# Patient Record
Sex: FEMALE | Race: WHITE | Employment: FULL TIME | URBAN - METROPOLITAN AREA
[De-identification: names, ages, dates, MRNs, and addresses within clinical notes are randomized per-mention and may not be internally consistent; named-entity substitution may affect disease eponyms.]

---

## 2017-01-05 PROBLEM — R50.9 FEVER OF UNKNOWN ORIGIN: Status: ACTIVE | Noted: 2017-01-05

## 2023-01-14 ENCOUNTER — APPOINTMENT (OUTPATIENT)
Dept: ULTRASOUND IMAGING | Age: 63
DRG: 446 | End: 2023-01-14
Payer: COMMERCIAL

## 2023-01-14 ENCOUNTER — HOSPITAL ENCOUNTER (INPATIENT)
Age: 63
LOS: 3 days | Discharge: HOME | DRG: 446 | End: 2023-01-17
Attending: EMERGENCY MEDICINE | Admitting: SURGERY
Payer: COMMERCIAL

## 2023-01-14 ENCOUNTER — APPOINTMENT (OUTPATIENT)
Dept: CT IMAGING | Age: 63
DRG: 446 | End: 2023-01-14
Payer: COMMERCIAL

## 2023-01-14 DIAGNOSIS — R74.01 TRANSAMINITIS: ICD-10-CM

## 2023-01-14 DIAGNOSIS — K81.0 ACUTE CHOLECYSTITIS: Primary | ICD-10-CM

## 2023-01-14 LAB
ALBUMIN SERPL-MCNC: 4.1 G/DL (ref 3.5–5.2)
ALP BLD-CCNC: 116 U/L (ref 35–104)
ALT SERPL-CCNC: 46 U/L (ref 0–32)
ANION GAP SERPL CALCULATED.3IONS-SCNC: 11 MMOL/L (ref 7–16)
AST SERPL-CCNC: 37 U/L (ref 0–31)
BACTERIA: ABNORMAL /HPF
BASOPHILS ABSOLUTE: 0.02 E9/L (ref 0–0.2)
BASOPHILS RELATIVE PERCENT: 0.4 % (ref 0–2)
BILIRUB SERPL-MCNC: 0.4 MG/DL (ref 0–1.2)
BILIRUBIN URINE: NEGATIVE
BLOOD, URINE: ABNORMAL
BUN BLDV-MCNC: 13 MG/DL (ref 6–23)
CALCIUM SERPL-MCNC: 9.6 MG/DL (ref 8.6–10.2)
CHLORIDE BLD-SCNC: 101 MMOL/L (ref 98–107)
CLARITY: CLEAR
CO2: 26 MMOL/L (ref 22–29)
COLOR: YELLOW
CREAT SERPL-MCNC: 0.8 MG/DL (ref 0.5–1)
EOSINOPHILS ABSOLUTE: 0.1 E9/L (ref 0.05–0.5)
EOSINOPHILS RELATIVE PERCENT: 1.8 % (ref 0–6)
EPITHELIAL CELLS, UA: ABNORMAL /HPF
GFR SERPL CREATININE-BSD FRML MDRD: >60 ML/MIN/1.73
GLUCOSE BLD-MCNC: 99 MG/DL (ref 74–99)
GLUCOSE URINE: NEGATIVE MG/DL
HCT VFR BLD CALC: 42.9 % (ref 34–48)
HEMOGLOBIN: 14.2 G/DL (ref 11.5–15.5)
IMMATURE GRANULOCYTES #: 0.02 E9/L
IMMATURE GRANULOCYTES %: 0.4 % (ref 0–5)
KETONES, URINE: NEGATIVE MG/DL
LACTIC ACID: 0.9 MMOL/L (ref 0.5–2.2)
LEUKOCYTE ESTERASE, URINE: NEGATIVE
LIPASE: 24 U/L (ref 13–60)
LYMPHOCYTES ABSOLUTE: 0.8 E9/L (ref 1.5–4)
LYMPHOCYTES RELATIVE PERCENT: 14.8 % (ref 20–42)
MCH RBC QN AUTO: 30.2 PG (ref 26–35)
MCHC RBC AUTO-ENTMCNC: 33.1 % (ref 32–34.5)
MCV RBC AUTO: 91.3 FL (ref 80–99.9)
MONOCYTES ABSOLUTE: 0.44 E9/L (ref 0.1–0.95)
MONOCYTES RELATIVE PERCENT: 8.1 % (ref 2–12)
NEUTROPHILS ABSOLUTE: 4.03 E9/L (ref 1.8–7.3)
NEUTROPHILS RELATIVE PERCENT: 74.5 % (ref 43–80)
NITRITE, URINE: NEGATIVE
PDW BLD-RTO: 13 FL (ref 11.5–15)
PH UA: 5.5 (ref 5–9)
PLATELET # BLD: 272 E9/L (ref 130–450)
PMV BLD AUTO: 9.7 FL (ref 7–12)
POTASSIUM SERPL-SCNC: 3.6 MMOL/L (ref 3.5–5)
PROTEIN UA: NEGATIVE MG/DL
RBC # BLD: 4.7 E12/L (ref 3.5–5.5)
RBC UA: ABNORMAL /HPF (ref 0–2)
SODIUM BLD-SCNC: 138 MMOL/L (ref 132–146)
SPECIFIC GRAVITY UA: 1.02 (ref 1–1.03)
TOTAL PROTEIN: 7.1 G/DL (ref 6.4–8.3)
UROBILINOGEN, URINE: 0.2 E.U./DL
WBC # BLD: 5.4 E9/L (ref 4.5–11.5)
WBC UA: ABNORMAL /HPF (ref 0–5)

## 2023-01-14 PROCEDURE — 76705 ECHO EXAM OF ABDOMEN: CPT

## 2023-01-14 PROCEDURE — 99285 EMERGENCY DEPT VISIT HI MDM: CPT

## 2023-01-14 PROCEDURE — 81001 URINALYSIS AUTO W/SCOPE: CPT

## 2023-01-14 PROCEDURE — 85025 COMPLETE CBC W/AUTO DIFF WBC: CPT

## 2023-01-14 PROCEDURE — 2580000003 HC RX 258: Performed by: EMERGENCY MEDICINE

## 2023-01-14 PROCEDURE — 6360000002 HC RX W HCPCS: Performed by: SURGERY

## 2023-01-14 PROCEDURE — C9113 INJ PANTOPRAZOLE SODIUM, VIA: HCPCS | Performed by: SURGERY

## 2023-01-14 PROCEDURE — 6360000004 HC RX CONTRAST MEDICATION: Performed by: RADIOLOGY

## 2023-01-14 PROCEDURE — A4216 STERILE WATER/SALINE, 10 ML: HCPCS | Performed by: SURGERY

## 2023-01-14 PROCEDURE — 87045 FECES CULTURE AEROBIC BACT: CPT

## 2023-01-14 PROCEDURE — 87449 NOS EACH ORGANISM AG IA: CPT

## 2023-01-14 PROCEDURE — 96361 HYDRATE IV INFUSION ADD-ON: CPT

## 2023-01-14 PROCEDURE — 74177 CT ABD & PELVIS W/CONTRAST: CPT

## 2023-01-14 PROCEDURE — 83605 ASSAY OF LACTIC ACID: CPT

## 2023-01-14 PROCEDURE — 96360 HYDRATION IV INFUSION INIT: CPT

## 2023-01-14 PROCEDURE — 83690 ASSAY OF LIPASE: CPT

## 2023-01-14 PROCEDURE — 87324 CLOSTRIDIUM AG IA: CPT

## 2023-01-14 PROCEDURE — 99222 1ST HOSP IP/OBS MODERATE 55: CPT | Performed by: SURGERY

## 2023-01-14 PROCEDURE — 2580000003 HC RX 258: Performed by: SURGERY

## 2023-01-14 PROCEDURE — 80053 COMPREHEN METABOLIC PANEL: CPT

## 2023-01-14 PROCEDURE — 1200000000 HC SEMI PRIVATE

## 2023-01-14 PROCEDURE — 2500000003 HC RX 250 WO HCPCS: Performed by: SURGERY

## 2023-01-14 PROCEDURE — 87077 CULTURE AEROBIC IDENTIFY: CPT

## 2023-01-14 RX ORDER — MORPHINE SULFATE 2 MG/ML
2 INJECTION, SOLUTION INTRAMUSCULAR; INTRAVENOUS EVERY 4 HOURS PRN
Status: DISCONTINUED | OUTPATIENT
Start: 2023-01-14 | End: 2023-01-17 | Stop reason: HOSPADM

## 2023-01-14 RX ORDER — 0.9 % SODIUM CHLORIDE 0.9 %
1000 INTRAVENOUS SOLUTION INTRAVENOUS ONCE
Status: COMPLETED | OUTPATIENT
Start: 2023-01-14 | End: 2023-01-14

## 2023-01-14 RX ORDER — SODIUM CHLORIDE 9 MG/ML
INJECTION, SOLUTION INTRAVENOUS PRN
Status: DISCONTINUED | OUTPATIENT
Start: 2023-01-14 | End: 2023-01-17 | Stop reason: HOSPADM

## 2023-01-14 RX ORDER — METRONIDAZOLE 500 MG/100ML
500 INJECTION, SOLUTION INTRAVENOUS EVERY 8 HOURS
Status: DISCONTINUED | OUTPATIENT
Start: 2023-01-14 | End: 2023-01-17

## 2023-01-14 RX ORDER — ONDANSETRON 4 MG/1
4 TABLET, ORALLY DISINTEGRATING ORAL EVERY 8 HOURS PRN
Status: DISCONTINUED | OUTPATIENT
Start: 2023-01-14 | End: 2023-01-17 | Stop reason: HOSPADM

## 2023-01-14 RX ORDER — ONDANSETRON 2 MG/ML
4 INJECTION INTRAMUSCULAR; INTRAVENOUS EVERY 6 HOURS PRN
Status: DISCONTINUED | OUTPATIENT
Start: 2023-01-14 | End: 2023-01-17 | Stop reason: HOSPADM

## 2023-01-14 RX ORDER — SODIUM CHLORIDE 0.9 % (FLUSH) 0.9 %
10 SYRINGE (ML) INJECTION PRN
Status: DISCONTINUED | OUTPATIENT
Start: 2023-01-14 | End: 2023-01-17 | Stop reason: HOSPADM

## 2023-01-14 RX ORDER — ENOXAPARIN SODIUM 100 MG/ML
40 INJECTION SUBCUTANEOUS DAILY
Status: DISCONTINUED | OUTPATIENT
Start: 2023-01-14 | End: 2023-01-17 | Stop reason: HOSPADM

## 2023-01-14 RX ORDER — SODIUM CHLORIDE 0.9 % (FLUSH) 0.9 %
10 SYRINGE (ML) INJECTION EVERY 12 HOURS SCHEDULED
Status: DISCONTINUED | OUTPATIENT
Start: 2023-01-14 | End: 2023-01-17 | Stop reason: HOSPADM

## 2023-01-14 RX ORDER — PANTOPRAZOLE SODIUM 20 MG/1
40 TABLET, DELAYED RELEASE ORAL DAILY
COMMUNITY

## 2023-01-14 RX ORDER — SODIUM CHLORIDE, SODIUM LACTATE, POTASSIUM CHLORIDE, CALCIUM CHLORIDE 600; 310; 30; 20 MG/100ML; MG/100ML; MG/100ML; MG/100ML
INJECTION, SOLUTION INTRAVENOUS CONTINUOUS
Status: DISCONTINUED | OUTPATIENT
Start: 2023-01-14 | End: 2023-01-15

## 2023-01-14 RX ORDER — MORPHINE SULFATE 4 MG/ML
4 INJECTION, SOLUTION INTRAMUSCULAR; INTRAVENOUS EVERY 4 HOURS PRN
Status: DISCONTINUED | OUTPATIENT
Start: 2023-01-14 | End: 2023-01-17 | Stop reason: HOSPADM

## 2023-01-14 RX ADMIN — METRONIDAZOLE 500 MG: 500 INJECTION, SOLUTION INTRAVENOUS at 18:56

## 2023-01-14 RX ADMIN — IOPAMIDOL 75 ML: 755 INJECTION, SOLUTION INTRAVENOUS at 15:07

## 2023-01-14 RX ADMIN — CEFTRIAXONE 1000 MG: 1 INJECTION, POWDER, FOR SOLUTION INTRAMUSCULAR; INTRAVENOUS at 18:47

## 2023-01-14 RX ADMIN — SODIUM CHLORIDE 1000 ML: 9 INJECTION, SOLUTION INTRAVENOUS at 12:22

## 2023-01-14 RX ADMIN — SODIUM CHLORIDE, POTASSIUM CHLORIDE, SODIUM LACTATE AND CALCIUM CHLORIDE: 600; 310; 30; 20 INJECTION, SOLUTION INTRAVENOUS at 20:21

## 2023-01-14 RX ADMIN — SODIUM CHLORIDE 40 MG: 9 INJECTION, SOLUTION INTRAMUSCULAR; INTRAVENOUS; SUBCUTANEOUS at 18:49

## 2023-01-14 RX ADMIN — ENOXAPARIN SODIUM 40 MG: 100 INJECTION SUBCUTANEOUS at 18:52

## 2023-01-14 ASSESSMENT — PAIN SCALES - GENERAL: PAINLEVEL_OUTOF10: 8

## 2023-01-14 ASSESSMENT — ENCOUNTER SYMPTOMS
SINUS PRESSURE: 0
VOMITING: 0
EYE PAIN: 0
SORE THROAT: 0
NAUSEA: 1
BACK PAIN: 0
DIARRHEA: 1
EYE REDNESS: 0
ABDOMINAL DISTENTION: 0
WHEEZING: 0
EYE DISCHARGE: 0
COUGH: 0
ABDOMINAL PAIN: 1
SHORTNESS OF BREATH: 0

## 2023-01-14 ASSESSMENT — PAIN - FUNCTIONAL ASSESSMENT: PAIN_FUNCTIONAL_ASSESSMENT: 0-10

## 2023-01-14 NOTE — H&P
General Surgery History and Physical  [facility]    Patient's Name/Date of Birth: Shobha Freitas / 1960    Date: January 14, 2023     Surgeon: Amara Avila M.D.    PCP: No primary care provider on file. Chief Complaint: Abdominal pain diarrhea    HPI:   Shobha Freitas is a 65 y.o. female who presents for evaluation of abdominal pain diarrhea. Timing is constant, radiation to upper abdomen, alleviated by n.p.o. and started several weeks ago and severity is 7/10. Patient admits ongoing issues with abdominal pain and diarrhea. States that anything she eats or drinks causes diarrhea and horrible crampy abdominal pain. Most of her pain radiates to the right upper quadrant. Denies similar in the past.  No shortness of breath or chest pain. Does have a history of breast cancer and had a mastectomy on the left side previously in 2016. Denies prior knowledge of gallbladder stones. Denies previous hepatic insufficiency. No alcohol use    Patient Active Problem List   Diagnosis    Fever of unknown origin    Acute cholecystitis       Past Medical History:   Diagnosis Date    Cancer ([address]     breast cancer, with left masectomy in 11/2016       Past Surgical History:   Procedure Laterality Date    BREAST SURGERY         No Known Allergies    The patient has a family history that is negative for severe cardiovascular or respiratory issues, negative for reaction to anesthesia. Specifically the patient reported no history of gastrointestinal cancer in his mother or his father to their knowledge. Time spent reviewing past medical, surgical, social and family history, vitals, nursing assessment and images. No changes from above documented history.     Social History     Socioeconomic History    Marital status:      Spouse name: Not on file    Number of children: Not on file    Years of education: Not on file    Highest education level: Not on file   Occupational History    Not on file Tobacco Use    Smoking status: Never    Smokeless tobacco: Not on file   Vaping Use    Vaping Use: Never used   Substance and Sexual Activity    Alcohol use: No    Drug use: Not on file    Sexual activity: Not on file   Other Topics Concern    Not on file   Social History Narrative    Not on file     Social Determinants of Health     Financial Resource Strain: Not on file   Food Insecurity: Not on file   Transportation Needs: Not on file   Physical Activity: Not on file   Stress: Not on file   Social Connections: Not on file   Intimate Partner Violence: Not on file   Housing Stability: Not on file       I have reviewed relevant labs from this admission and interpretation is included in my assessment and plan    Review of Systems    A complete 10 system review was performed and are otherwise negative unless mentioned in the above HPI. Specific negatives are listed below but may not include all those reviewed.     General ROS: negative obtundation, AMS  ENT ROS: negative rhinorrhea, epistaxis  Allergy and Immunology ROS: negative itchy/watery eyes or nasal congestion  Hematological and Lymphatic ROS: negative spontaneous bleeding or bruising  Endocrine ROS: negative  lethargy, mood swings, palpitations or polydipsia/polyuria  Respiratory ROS: negative sputum changes, stridor, tachypnea or wheezing  Cardiovascular ROS: negative for - loss of consciousness, murmur or orthopnea  Gastrointestinal ROS: negative for - hematochezia or hematemesis  Genito-Urinary ROS: negative for -  genital discharge or hematuria  Musculoskeletal ROS: negative for - focal weakness, gangrene  Psych/Neuro ROS: negative for - visual or auditory hallucinations, suicidal ideation    Physical exam:   /89   Pulse (!) 105   Temp 97.8 °F (36.6 °C)   Resp 16   Ht 5' 6\" (1.676 m)   Wt 190 lb (86.2 kg)   SpO2 99%   BMI 30.67 kg/m²   General appearance:  NAD, appears stated age  Head: NCAT, PERRLA, EOMI, red conjunctiva  Neck: supple, no masses, trachea midline  Lungs: Equal chest rise bilateral, no retractions, no wheezing  Heart: Reg rate  Abdomen: soft, mildly tender upper abdomen, mildly distended  Skin; warm and dry, no cyanosis  Gu: no cva tenderness  Extremities: atraumatic, no focal motor deficits, no open wounds  Psych: No tremor, visual hallucinations      Radiology: I reviewed relevant abdominal imaging from this admission and that available in the EMR including CT abd/pel from admission. My assessment is gallstones mild gallbladder wall thickening with dilation of the common    Assessment:  Hugo Cabezas is a 65 y.o. female with acute cholecystitis, transaminitis  Patient Active Problem List   Diagnosis    Fever of unknown origin    Acute cholecystitis         Plan:  Admission  IV fluid hydration  IV antibiotic  Stool studies  Full liquid diet n.p.o. past midnight  Pending her improvement overnight likely cholecystectomy in the next 24 to 48 hours  Discussed risks of injury to liver, common bile duct, hepatic duct, surrounding vascular structures, small bowel, stomach. Risk for further surgery to correct complications. Plan for laparoscopic, possible open cholecystectomy with possible intraoperative cholangiogram. Patient agrees and all questions answered to their and family's satisfaction        NOTE: This report, in part or full, may have been transcribed using voice recognition software. Every effort was made to ensure accuracy; however, inadvertent computerized transcription errors may be present. Please excuse any transcriptional grammatical or spelling errors that may have escaped my editorial review.     Seamus Shrestha MD  4:34 PM  1/14/2023

## 2023-01-14 NOTE — ED PROVIDER NOTES
Patient with right upper quadrant abdominal pain associated with nausea and diarrhea that began several days ago. No fever or chills. Abdominal Pain  Pain location:  RUQ  Pain quality: aching    Pain radiates to:  Does not radiate  Pain severity:  Moderate  Onset quality:  Sudden  Duration:  1 week  Timing:  Intermittent  Progression:  Waxing and waning  Chronicity:  New  Relieved by:  None tried  Worsened by:  Eating  Ineffective treatments:  None tried  Associated symptoms: anorexia, diarrhea and nausea    Associated symptoms: no chest pain, no chills, no cough, no dysuria, no fever, no shortness of breath, no sore throat and no vomiting    Risk factors: obesity       Review of Systems   Constitutional:  Negative for chills and fever. HENT:  Negative for sinus pressure and sore throat. Eyes:  Negative for pain, discharge and redness. Respiratory:  Negative for cough, shortness of breath and wheezing. Cardiovascular:  Negative for chest pain. Gastrointestinal:  Positive for abdominal pain, anorexia, diarrhea and nausea. Negative for abdominal distention and vomiting. Genitourinary:  Negative for dysuria and frequency. Musculoskeletal:  Negative for arthralgias and back pain. Skin:  Negative for rash and wound. Neurological:  Negative for weakness and headaches. Hematological:  Negative for adenopathy. All other systems reviewed and are negative. Physical Exam  Vitals and nursing note reviewed. Constitutional:       Appearance: She is well-developed. HENT:      Head: Normocephalic and atraumatic. Eyes:      Pupils: Pupils are equal, round, and reactive to light. Cardiovascular:      Rate and Rhythm: Regular rhythm. Tachycardia present. Heart sounds: Normal heart sounds. No murmur heard. Pulmonary:      Effort: Pulmonary effort is normal. No respiratory distress. Breath sounds: Normal breath sounds. No wheezing or rales.    Abdominal:      General: Bowel sounds are normal.      Palpations: Abdomen is soft. Tenderness: There is abdominal tenderness in the right upper quadrant. There is no guarding or rebound. Musculoskeletal:      Cervical back: Normal range of motion and neck supple. Skin:     General: Skin is warm and dry. Neurological:      Mental Status: She is alert and oriented to person, place, and time. Cranial Nerves: No cranial nerve deficit. Coordination: Coordination normal.        Procedures     MDM     Today's evaluation was designed to evaluate the patient's chief complaint of upper abdominal pain based on multiple differential diagnosis. The goal being to differentiate between emergent, urgent, and non urgent etiologies. The patient understands that not all possibilities can be completely evaluated or ruled out during this visit and require further evaluation by a primary care physician or specialist who can coordinate and evaluate further testing and treatments. Some of the differentials considered today include (but are not limited to):     Upper abdominal pain syndromes:    Right upper quadrant:  Biliary etiology-gallstones, cholecystitis, cholangitis, sphincter of Oddi dysfunction  Hepatic etiologies-hepatitis, perihepatitis, liver abscess, Budd-Chiari syndrome, portal vein thrombosis    Epigastric pain:  Acute myocardial infarction, pancreatitis, peptic ulcer disease, GERD, gastritis, dyspepsia, gastroparesis    Left upper quadrant pain:  Splenomegaly, splenic infarction, splenic abscess, splenic rupture      Lower abdominal pain syndromes:    Acute appendicitis, diverticulitis, kidney stones, pyelonephritis, cystitis, acute urinary retention, infectious colitis      Diffuse abdominal pain syndromes:    Obstruction, perforation, mesenteric ischemia, inflammatory bowel disease, viral gastroenteritis, spontaneous bacterial peritonitis, malignancy, celiac disease, ketoacidosis, adrenal insufficiency, foodborne disease, irritable bowel, constipation, cannabinoid hyperemesis syndrome. Less common causes:  abdominal aortic aneurysm, abdominal compartment syndrome, abdominal migraine, hepatic porphyrias, angioedema      Many as above ruled out by CAT scan and lab work. Ultrasound does confirm acute cholecystitis. Discussed with Dr. Ilana Vazquez, general surgeon, bedside. He has evaluated the patient and agrees to admit.         --------------------------------------------- PAST HISTORY ---------------------------------------------  Past Medical History:  has a past medical history of [facility] Past Surgical History:  has a past surgical history that includes Breast surgery. Social History:  reports that she has never smoked. She does not have any smokeless tobacco history on file. She reports that she does not drink alcohol. Family History: family history is not on file. The patients home medications have been reviewed. Allergies: Patient has no known allergies.     -------------------------------------------------- RESULTS -------------------------------------------------    LABS:  Results for orders placed or performed during the hospital encounter of 01/14/23   CBC with Auto Differential   Result Value Ref Range    WBC 5.4 4.5 - 11.5 E9/L    RBC 4.70 3.50 - 5.50 E12/L    Hemoglobin 14.2 11.5 - 15.5 g/dL    Hematocrit 42.9 34.0 - 48.0 %    MCV 91.3 80.0 - 99.9 fL    MCH 30.2 26.0 - 35.0 pg    MCHC 33.1 32.0 - 34.5 %    RDW 13.0 11.5 - 15.0 fL    Platelets 925 069 - 620 E9/L    MPV 9.7 7.0 - 12.0 fL    Neutrophils % 74.5 43.0 - 80.0 %    Immature Granulocytes % 0.4 0.0 - 5.0 %    Lymphocytes % 14.8 (L) 20.0 - 42.0 %    Monocytes % 8.1 2.0 - 12.0 %    Eosinophils % 1.8 0.0 - 6.0 %    Basophils % 0.4 0.0 - 2.0 %    Neutrophils Absolute 4.03 1.80 - 7.30 E9/L    Immature Granulocytes # 0.02 E9/L    Lymphocytes Absolute 0.80 (L) 1.50 - 4.00 E9/L    Monocytes Absolute 0.44 0.10 - 0.95 E9/L    Eosinophils Absolute 0.10 0.05 - 0.50 E9/L    Basophils Absolute 0.02 0.00 - 0.20 E9/L   Comprehensive Metabolic Panel   Result Value Ref Range    Sodium 138 132 - 146 mmol/L    Potassium 3.6 3.5 - 5.0 mmol/L    Chloride 101 98 - 107 mmol/L    CO2 26 22 - 29 mmol/L    Anion Gap 11 7 - 16 mmol/L    Glucose 99 74 - 99 mg/dL    BUN 13 6 - 23 mg/dL    Creatinine 0.8 0.5 - 1.0 mg/dL    Est, Glom Filt Rate >60 >=60 mL/min/1.73    Calcium 9.6 8.6 - 10.2 mg/dL    Total Protein 7.1 6.4 - 8.3 g/dL    Albumin 4.1 3.5 - 5.2 g/dL    Total Bilirubin 0.4 0.0 - 1.2 mg/dL    Alkaline Phosphatase 116 (H) 35 - 104 U/L    ALT 46 (H) 0 - 32 U/L    AST 37 (H) 0 - 31 U/L   Lipase   Result Value Ref Range    Lipase 24 13 - 60 U/L   Lactic Acid   Result Value Ref Range    Lactic Acid 0.9 0.5 - 2.2 mmol/L   Urinalysis   Result Value Ref Range    Color, UA Yellow Straw/Yellow    Clarity, UA Clear Clear    Glucose, Ur Negative Negative mg/dL    Bilirubin Urine Negative Negative    Ketones, Urine Negative Negative mg/dL    Specific Gravity, UA 1.025 1.005 - 1.030    Blood, Urine TRACE (A) Negative    pH, UA 5.5 5.0 - 9.0    Protein, UA Negative Negative mg/dL    Urobilinogen, Urine 0.2 <2.0 E.U./dL    Nitrite, Urine Negative Negative    Leukocyte Esterase, Urine Negative Negative   Microscopic Urinalysis   Result Value Ref Range    WBC, UA 0-1 0 - 5 /HPF    RBC, UA 1-3 0 - 2 /HPF    Epithelial Cells, UA FEW /HPF    Bacteria, UA FEW (A) None Seen /HPF       RADIOLOGY:  CT ABDOMEN PELVIS W IV CONTRAST Additional Contrast? None   Final Result   1. No indication for acute intraperitoneal retroperitoneal process in the   abdomen pelvis. 2.  No indication for metastatic disease in the abdomen or in the pelvis.          US GALLBLADDER RUQ    (Results Pending)           ------------------------- NURSING NOTES AND VITALS REVIEWED ---------------------------  Date / Time Roomed:  1/14/2023 11:13 AM  ED Bed Assignment:  13/13    The nursing notes within the ED encounter and vital signs as below have been reviewed. Patient Vitals for the past 24 hrs:   BP Temp Pulse Resp SpO2 Height Weight   01/14/23 1115 -- -- -- -- -- 5' 6\" (1.676 m) 190 lb (86.2 kg)   01/14/23 1112 133/89 -- -- -- -- -- --   01/14/23 1051 -- 97.8 °F (36.6 °C) (!) 105 16 99 % -- --       Oxygen Saturation Interpretation: Normal    ------------------------------------------ PROGRESS NOTES ------------------------------------------  Re-evaluation(s):  Time: 1600  Patients symptoms show no change  Repeat physical examination is not changed    Counseling:  I have spoken with the patient and discussed todays results, in addition to providing specific details for the plan of care and counseling regarding the diagnosis and prognosis. Their questions are answered at this time and they are agreeable with the plan of admission.    --------------------------------- ADDITIONAL PROVIDER NOTES ---------------------------------  Consultations:  See above  This patient's ED course included: a personal history and physicial examination and multiple bedside re-evaluations    This patient has remained hemodynamically stable during their ED course. Diagnosis:  1. Acute cholecystitis    2. Transaminitis        Disposition:  Patient's disposition: Admit to med/surg floor  Patient's condition is stable.            Racheal Gallagher DO  01/14/23 [id number]

## 2023-01-15 LAB
ADENOVIRUS BY PCR: NOT DETECTED
ALBUMIN SERPL-MCNC: 3.5 G/DL (ref 3.5–5.2)
ALP BLD-CCNC: 93 U/L (ref 35–104)
ALT SERPL-CCNC: 33 U/L (ref 0–32)
ANION GAP SERPL CALCULATED.3IONS-SCNC: 12 MMOL/L (ref 7–16)
AST SERPL-CCNC: 24 U/L (ref 0–31)
BASOPHILS ABSOLUTE: 0.01 E9/L (ref 0–0.2)
BASOPHILS RELATIVE PERCENT: 0.2 % (ref 0–2)
BILIRUB SERPL-MCNC: 0.3 MG/DL (ref 0–1.2)
BILIRUBIN DIRECT: <0.2 MG/DL (ref 0–0.3)
BILIRUBIN, INDIRECT: ABNORMAL MG/DL (ref 0–1)
BORDETELLA PARAPERTUSSIS BY PCR: NOT DETECTED
BORDETELLA PERTUSSIS BY PCR: NOT DETECTED
BUN BLDV-MCNC: 7 MG/DL (ref 6–23)
C DIFF TOXIN/ANTIGEN: NORMAL
CALCIUM SERPL-MCNC: 8.3 MG/DL (ref 8.6–10.2)
CHLAMYDOPHILIA PNEUMONIAE BY PCR: NOT DETECTED
CHLORIDE BLD-SCNC: 102 MMOL/L (ref 98–107)
CO2: 24 MMOL/L (ref 22–29)
CORONAVIRUS 229E BY PCR: NOT DETECTED
CORONAVIRUS HKU1 BY PCR: NOT DETECTED
CORONAVIRUS NL63 BY PCR: NOT DETECTED
CORONAVIRUS OC43 BY PCR: NOT DETECTED
CREAT SERPL-MCNC: 0.8 MG/DL (ref 0.5–1)
EOSINOPHILS ABSOLUTE: 0.08 E9/L (ref 0.05–0.5)
EOSINOPHILS RELATIVE PERCENT: 1.8 % (ref 0–6)
GFR SERPL CREATININE-BSD FRML MDRD: >60 ML/MIN/1.73
GLUCOSE BLD-MCNC: 98 MG/DL (ref 74–99)
HCT VFR BLD CALC: 34.1 % (ref 34–48)
HEMOGLOBIN: 11.9 G/DL (ref 11.5–15.5)
HUMAN METAPNEUMOVIRUS BY PCR: NOT DETECTED
HUMAN RHINOVIRUS/ENTEROVIRUS BY PCR: NOT DETECTED
IMMATURE GRANULOCYTES #: 0.01 E9/L
IMMATURE GRANULOCYTES %: 0.2 % (ref 0–5)
INFLUENZA A BY PCR: NOT DETECTED
INFLUENZA B BY PCR: NOT DETECTED
LYMPHOCYTES ABSOLUTE: 0.76 E9/L (ref 1.5–4)
LYMPHOCYTES RELATIVE PERCENT: 16.9 % (ref 20–42)
MAGNESIUM: 1.5 MG/DL (ref 1.6–2.6)
MCH RBC QN AUTO: 30.9 PG (ref 26–35)
MCHC RBC AUTO-ENTMCNC: 34.9 % (ref 32–34.5)
MCV RBC AUTO: 88.6 FL (ref 80–99.9)
MONOCYTES ABSOLUTE: 0.52 E9/L (ref 0.1–0.95)
MONOCYTES RELATIVE PERCENT: 11.6 % (ref 2–12)
MYCOPLASMA PNEUMONIAE BY PCR: NOT DETECTED
NEUTROPHILS ABSOLUTE: 3.11 E9/L (ref 1.8–7.3)
NEUTROPHILS RELATIVE PERCENT: 69.3 % (ref 43–80)
PARAINFLUENZA VIRUS 1 BY PCR: NOT DETECTED
PARAINFLUENZA VIRUS 2 BY PCR: NOT DETECTED
PARAINFLUENZA VIRUS 3 BY PCR: NOT DETECTED
PARAINFLUENZA VIRUS 4 BY PCR: NOT DETECTED
PDW BLD-RTO: 13 FL (ref 11.5–15)
PLATELET # BLD: 217 E9/L (ref 130–450)
PMV BLD AUTO: 9.7 FL (ref 7–12)
POTASSIUM REFLEX MAGNESIUM: 2.8 MMOL/L (ref 3.5–5)
RBC # BLD: 3.85 E12/L (ref 3.5–5.5)
RESPIRATORY SYNCYTIAL VIRUS BY PCR: NOT DETECTED
SARS-COV-2, PCR: NOT DETECTED
SODIUM BLD-SCNC: 138 MMOL/L (ref 132–146)
TOTAL PROTEIN: 5.8 G/DL (ref 6.4–8.3)
WBC # BLD: 4.5 E9/L (ref 4.5–11.5)

## 2023-01-15 PROCEDURE — 6370000000 HC RX 637 (ALT 250 FOR IP): Performed by: INTERNAL MEDICINE

## 2023-01-15 PROCEDURE — 0202U NFCT DS 22 TRGT SARS-COV-2: CPT

## 2023-01-15 PROCEDURE — 2580000003 HC RX 258: Performed by: SURGERY

## 2023-01-15 PROCEDURE — 99221 1ST HOSP IP/OBS SF/LOW 40: CPT | Performed by: SURGERY

## 2023-01-15 PROCEDURE — 80048 BASIC METABOLIC PNL TOTAL CA: CPT

## 2023-01-15 PROCEDURE — 6360000002 HC RX W HCPCS: Performed by: SURGERY

## 2023-01-15 PROCEDURE — 85025 COMPLETE CBC W/AUTO DIFF WBC: CPT

## 2023-01-15 PROCEDURE — 83735 ASSAY OF MAGNESIUM: CPT

## 2023-01-15 PROCEDURE — 87040 BLOOD CULTURE FOR BACTERIA: CPT

## 2023-01-15 PROCEDURE — 80076 HEPATIC FUNCTION PANEL: CPT

## 2023-01-15 PROCEDURE — 6370000000 HC RX 637 (ALT 250 FOR IP): Performed by: SURGERY

## 2023-01-15 PROCEDURE — 1200000000 HC SEMI PRIVATE

## 2023-01-15 PROCEDURE — 2500000003 HC RX 250 WO HCPCS: Performed by: SURGERY

## 2023-01-15 PROCEDURE — C9113 INJ PANTOPRAZOLE SODIUM, VIA: HCPCS | Performed by: SURGERY

## 2023-01-15 PROCEDURE — 87088 URINE BACTERIA CULTURE: CPT

## 2023-01-15 PROCEDURE — 6360000002 HC RX W HCPCS: Performed by: NURSE PRACTITIONER

## 2023-01-15 PROCEDURE — 36415 COLL VENOUS BLD VENIPUNCTURE: CPT

## 2023-01-15 PROCEDURE — A4216 STERILE WATER/SALINE, 10 ML: HCPCS | Performed by: SURGERY

## 2023-01-15 RX ORDER — ACETAMINOPHEN 325 MG/1
650 TABLET ORAL EVERY 4 HOURS PRN
Status: DISCONTINUED | OUTPATIENT
Start: 2023-01-15 | End: 2023-01-17 | Stop reason: HOSPADM

## 2023-01-15 RX ORDER — LOPERAMIDE HYDROCHLORIDE 2 MG/1
2 CAPSULE ORAL 4 TIMES DAILY PRN
Status: DISCONTINUED | OUTPATIENT
Start: 2023-01-15 | End: 2023-01-17 | Stop reason: HOSPADM

## 2023-01-15 RX ORDER — POTASSIUM CHLORIDE 7.45 MG/ML
10 INJECTION INTRAVENOUS PRN
Status: DISCONTINUED | OUTPATIENT
Start: 2023-01-15 | End: 2023-01-17 | Stop reason: HOSPADM

## 2023-01-15 RX ORDER — MAGNESIUM SULFATE 1 G/100ML
1000 INJECTION INTRAVENOUS PRN
Status: DISCONTINUED | OUTPATIENT
Start: 2023-01-15 | End: 2023-01-17 | Stop reason: HOSPADM

## 2023-01-15 RX ORDER — POTASSIUM CHLORIDE 20 MEQ/1
40 TABLET, EXTENDED RELEASE ORAL PRN
Status: DISCONTINUED | OUTPATIENT
Start: 2023-01-15 | End: 2023-01-17 | Stop reason: HOSPADM

## 2023-01-15 RX ORDER — SODIUM CHLORIDE AND POTASSIUM CHLORIDE 300; 900 MG/100ML; MG/100ML
INJECTION, SOLUTION INTRAVENOUS CONTINUOUS
Status: DISCONTINUED | OUTPATIENT
Start: 2023-01-15 | End: 2023-01-17 | Stop reason: HOSPADM

## 2023-01-15 RX ADMIN — ACETAMINOPHEN 650 MG: 325 TABLET ORAL at 04:30

## 2023-01-15 RX ADMIN — METRONIDAZOLE 500 MG: 500 INJECTION, SOLUTION INTRAVENOUS at 03:54

## 2023-01-15 RX ADMIN — POTASSIUM CHLORIDE 10 MEQ: 10 INJECTION, SOLUTION INTRAVENOUS at 17:32

## 2023-01-15 RX ADMIN — POTASSIUM CHLORIDE 10 MEQ: 10 INJECTION, SOLUTION INTRAVENOUS at 10:34

## 2023-01-15 RX ADMIN — ACETAMINOPHEN 650 MG: 325 TABLET ORAL at 10:44

## 2023-01-15 RX ADMIN — POTASSIUM CHLORIDE 10 MEQ: 10 INJECTION, SOLUTION INTRAVENOUS at 14:41

## 2023-01-15 RX ADMIN — SERTRALINE 50 MG: 50 TABLET, FILM COATED ORAL at 19:22

## 2023-01-15 RX ADMIN — POTASSIUM CHLORIDE 10 MEQ: 10 INJECTION, SOLUTION INTRAVENOUS at 12:57

## 2023-01-15 RX ADMIN — CEFTRIAXONE 1000 MG: 1 INJECTION, POWDER, FOR SOLUTION INTRAMUSCULAR; INTRAVENOUS at 08:51

## 2023-01-15 RX ADMIN — POTASSIUM CHLORIDE 10 MEQ: 10 INJECTION, SOLUTION INTRAVENOUS at 22:20

## 2023-01-15 RX ADMIN — POTASSIUM CHLORIDE 10 MEQ: 10 INJECTION, SOLUTION INTRAVENOUS at 19:26

## 2023-01-15 RX ADMIN — ENOXAPARIN SODIUM 40 MG: 100 INJECTION SUBCUTANEOUS at 08:52

## 2023-01-15 RX ADMIN — MAGNESIUM SULFATE HEPTAHYDRATE 1000 MG: 1 INJECTION, SOLUTION INTRAVENOUS at 07:37

## 2023-01-15 RX ADMIN — SODIUM CHLORIDE 40 MG: 9 INJECTION, SOLUTION INTRAMUSCULAR; INTRAVENOUS; SUBCUTANEOUS at 08:51

## 2023-01-15 RX ADMIN — MAGNESIUM SULFATE HEPTAHYDRATE 1000 MG: 1 INJECTION, SOLUTION INTRAVENOUS at 07:33

## 2023-01-15 ASSESSMENT — PAIN SCALES - GENERAL
PAINLEVEL_OUTOF10: 7
PAINLEVEL_OUTOF10: 10

## 2023-01-15 ASSESSMENT — PAIN DESCRIPTION - ORIENTATION: ORIENTATION: ANTERIOR;POSTERIOR

## 2023-01-15 ASSESSMENT — PAIN DESCRIPTION - DESCRIPTORS: DESCRIPTORS: ACHING;DULL;DISCOMFORT

## 2023-01-15 ASSESSMENT — PAIN DESCRIPTION - LOCATION: LOCATION: HEAD

## 2023-01-15 ASSESSMENT — PAIN - FUNCTIONAL ASSESSMENT: PAIN_FUNCTIONAL_ASSESSMENT: PREVENTS OR INTERFERES SOME ACTIVE ACTIVITIES AND ADLS

## 2023-01-15 NOTE — CONSULTS
Department of Internal Medicine  Internal Medicine Consultation Note    Primary Care Physician: No primary care provider on file. Admitting Physician:  Adri Cummins MD  Admission date and time: 1/14/2023 11:13 AM    Room:  9922/7980-54  Admitting diagnosis: Acute cholecystitis [K81.0]  Transaminitis [R74.01]    Patient Name: Isauro Justin  MRN: [id number]    Date of Service: 1/15/2023     Reason for consultation:  Medical management    HISTORY OF PRESENT ILLNESS:    Isauro Justin is a 71-year-old female patient presented to [address] [place] [address] emergency department with complaints of ongoing diarrhea. This is been the seventh or eighth day with her symptoms. ER work-up was rather unremarkable. CBC did not show any leukocytosis, leukopenia. Urinalysis was unremarkable. Lactic acid was negative. Lipase was negative. Metabolic panel revealed very mild elevation of the liver enzymes with alkaline phosphatase 116, ALT 46, and AST 37. Hepatic function panel repeated revealed a downtrend and liver enzymes overall and persistently normal total bili. She did have some resultant electrolyte disturbance from the ongoing diarrhea with hypokalemia and hypomagnesia on subsequent labs. ER physician reached out to general surgery and patient was admitted for further care and management. Internal medicine consultation was requested. The patient was seen and examined at bedside. She confirmed the above given history. She denies any abdominal pain. States that she has not had any meals outside of her normal routine and other members of the household have had the same food without any resultant symptoms. No fevers or chills. No known sick contact or exposure. No headache or acute neurologic complaints. No chest pain or palpitation, fluttering. No shortness of breath, cough or URI complaints. No hematemesis, hematochezia or melena. Mild nausea without vomiting. No acute urinary complaints.   We have reviewed her medical and surgical history at length, no significant cardiovascular or cardiopulmonary disease that would prohibit anesthesia. PAST MEDICAL Hx:  Past Medical History:   Diagnosis Date    Cancer [facility])     breast cancer, with left masectomy in 11/2016       PAST SURGICAL Hx:   Past Surgical History:   Procedure Laterality Date    BREAST SURGERY         FAMILY Hx:  History reviewed. No pertinent family history. HOME MEDICATIONS:  Prior to Admission medications    Medication Sig Start Date End Date Taking?  Authorizing Provider   pantoprazole (PROTONIX) 20 MG tablet Take 40 mg by mouth daily   Yes Historical Provider, MD   sertraline (ZOLOFT) 50 MG tablet Take 50 mg by mouth daily    Historical Provider, MD   hydrochlorothiazide (HYDRODIURIL) 25 MG tablet Take 25 mg by mouth daily    Historical Provider, MD   loratadine (CLARITIN) 10 MG capsule Take 10 mg by mouth daily    Historical Provider, MD   Omega-3 Fatty Acids (FISH OIL) 1200 MG CAPS Take 1,200 mg by mouth daily     Historical Provider, MD   vitamin D (CHOLECALCIFEROL) 1000 UNIT TABS tablet Take 1,000 Units by mouth daily  Patient not taking: Reported on 1/14/2023    Historical Provider, MD   vitamin B-12 (CYANOCOBALAMIN) 1000 MCG tablet Take 1,000 mcg by mouth daily  Patient not taking: Reported on 1/14/2023    Historical Provider, MD   Lactobacillus (RA ACIDOPHILUS) 300 MG CAPS Take 300 mg by mouth daily  Patient not taking: Reported on 1/14/2023    Historical Provider, MD   DOCEtaxel (TAXOTERE) 20 MG/ML chemo injection Infuse intravenously every 21 days  Patient not taking: Reported on 1/14/2023    Historical Provider, MD   cyclophosphamide (CYTOXAN) 500 MG chemo injection Infuse intravenously every 21 days  Patient not taking: Reported on 1/14/2023    Historical Provider, MD   pegfilgrastim (NEULASTA) 6 MG/0.6ML injection Inject 6 mg into the skin every 21 days  Patient not taking: Reported on 1/14/2023    Historical Provider, MD ALLERGIES:  Patient has no known allergies. SOCIAL Hx:  Social History     Socioeconomic History    Marital status:      Spouse name: Not on file    Number of children: Not on file    Years of education: Not on file    Highest education level: Not on file   Occupational History    Not on file   Tobacco Use    Smoking status: Never    Smokeless tobacco: Not on file   Vaping Use    Vaping Use: Never used   Substance and Sexual Activity    Alcohol use: No    Drug use: Not on file    Sexual activity: Not on file   Other Topics Concern    Not on file   Social History Narrative    Not on file     Social Determinants of Health     Financial Resource Strain: Not on file   Food Insecurity: Not on file   Transportation Needs: Not on file   Physical Activity: Not on file   Stress: Not on file   Social Connections: Not on file   Intimate Partner Violence: Not on file   Housing Stability: Not on file       ROS: 12 point review of symptoms was conducted, pertinent positives and negative were reviewed, aside from that all 12 systems were reviewed and negative. PHYSICAL EXAM:  VITALS:  Vitals:    01/15/23 0631   BP: 130/64   Pulse: 80   Resp: 18   Temp: 97.5 °F (36.4 °C)   SpO2: 93%         CONSTITUTIONAL:    Awake, alert, cooperative, mildly uncomfortable, no apparent distress    EYES:    PERRL, EOMI, sclera clear without icterus, conjunctiva normal    ENT:    Normocephalic, atraumatic, sinuses nontender on palpation. External ears without lesions. Oral pharynx with moist mucus membranes. NECK:    Supple, symmetrical, trachea midline, no adenopathy, thyroid symmetric, not enlarged and no tenderness, skin normal, no bruits, no JVD    HEMATOLOGIC/LYMPHATICS:    No cervical lymphadenopathy and no supraclavicular lymphadenopathy    LUNGS:    Symmetric.  No increased work of breathing, normal air exchange, clear to auscultation bilaterally, no wheezes, rhonchi, or rales,     CARDIOVASCULAR:    Normal apical impulse, regular rate and rhythm, normal S1 and S2, no murmur noted    ABDOMEN:    Obese contour, normal bowel sounds, soft, non-distended, non-tender, no rebound or guarding elicited on palpation . In particular, palpation of the right upper quadrant does not reveal any significant overt discomfort and Nicol Och sign is not diffuse on examination. MUSCULOSKELETAL:    There is no redness, warmth, or swelling of the joints. Tone is normal.    NEUROLOGIC:    Awake, alert, oriented to name, place and time. Cranial nerves II-XII are grossly intact. Motor is 5 out of 5 bilaterally. SKIN:    No bruising or bleeding. No redness, warmth, or swelling    EXTREMITIES:    Peripheral pulses present. No edema, cyanosis, or swelling.     LABORATORY DATA:  CBC with Differential:    Lab Results   Component Value Date/Time    WBC 4.5 01/15/2023 05:07 AM    RBC 3.85 01/15/2023 05:07 AM    HGB 11.9 01/15/2023 05:07 AM    HCT 34.1 01/15/2023 05:07 AM     01/15/2023 05:07 AM    MCV 88.6 01/15/2023 05:07 AM    MCH 30.9 01/15/2023 05:07 AM    MCHC 34.9 01/15/2023 05:07 AM    RDW 13.0 01/15/2023 05:07 AM    NRBC 1.0 01/07/2017 09:13 AM    METASPCT 2.0 01/07/2017 09:13 AM    LYMPHOPCT 16.9 01/15/2023 05:07 AM    PROMYELOPCT 1.5 01/07/2017 09:13 AM    MONOPCT 11.6 01/15/2023 05:07 AM    MYELOPCT 3.5 01/07/2017 09:13 AM    BASOPCT 0.2 01/15/2023 05:07 AM    MONOSABS 0.52 01/15/2023 05:07 AM    LYMPHSABS 0.76 01/15/2023 05:07 AM    EOSABS 0.08 01/15/2023 05:07 AM    BASOSABS 0.01 01/15/2023 05:07 AM     CMP:    Lab Results   Component Value Date/Time     01/15/2023 05:07 AM    K 2.8 01/15/2023 05:07 AM     01/15/2023 05:07 AM    CO2 24 01/15/2023 05:07 AM    BUN 7 01/15/2023 05:07 AM    CREATININE 0.8 01/15/2023 05:07 AM    GFRAA >60 01/07/2017 09:13 AM    LABGLOM >60 01/15/2023 05:07 AM    GLUCOSE 98 01/15/2023 05:07 AM    PROT 5.8 01/15/2023 05:07 AM    LABALBU 3.5 01/15/2023 05:07 AM    CALCIUM 8.3 01/15/2023 05:07 AM    BILITOT 0.3 01/15/2023 05:07 AM    ALKPHOS 93 01/15/2023 05:07 AM    AST 24 01/15/2023 05:07 AM    ALT 33 01/15/2023 05:07 AM       ASSESSMENT:  Acute, likely superimposed on chronic, calculus cholecystitis with resultant diarrhea  Multiple electrolyte disturbance secondary to #1  Prior history of breast cancer status post mastectomy and systemic therapy    PLAN:  Eliana Gupta is a 80-year-old female who presented to [address] [place] [address] with predominant complaint of diarrhea with a relatively benign work-up aside from findings of gallstones and sludge on gallbladder imaging. Liver enzymes have trended downward. Fluids and antibiotics are being administered. Infectious work-up is being undertaken including blood, urine cultures and stool cultures. Dietary advancement as per the surgery team.  Symptomatic and supportive care being employed. She is 65years old without any significant comorbidities that would prohibit anesthesia or surgery and is at her baseline restratification for procedure. If surgery is warranted, she is considered appropriately risk ratified from internal medicine standpoint. Routine lab will be assessed including A1c, lipid panel and thyroid studies will be addressed accordingly. Underlying co-morbidites will be addressed during hospitalization as well. Labs and vital signs will be monitored closely and addressed accordingly. See additional orders for details.      PRATEEK Kahn CNP  7:04 AM  1/15/2023    Electronically signed by PRATEEK Kahn CNP on 1/15/23 at 7:04 AM EST

## 2023-01-15 NOTE — PROGRESS NOTES
Physical Therapy Initial Evaluation/Plan of Care    Room #:  3877/9562-04  Patient Name: Kym Laws  YOB: 1960  MRN: [id number]    Date of Service: 1/16/2023     Tentative placement recommendation: Home with no Physical Therapy needs  Equipment recommendation: None      Evaluating Physical Therapist: Bre Malone, PT #[id number]      Specific Provider Orders/Date/Referring Provider :  01/15/23 0715    PT eval and treat  Start:  01/15/23 0715,   End:  01/15/23 0715,   ONE TIME,   Standing Count:  1 Occurrences,   R         Jarad Del Toro, APRN - CNP     Admitting Diagnosis:   Acute cholecystitis [K81.0]  Transaminitis [R74.01]      right upper quadrant abdominal pain associated with nausea and diarrhea that began several days ago. Surgery: none  Visit Diagnoses         Codes    Transaminitis     R74.01            Patient Active Problem List   Diagnosis    Fever of unknown origin    Acute cholecystitis      ASSESSMENT of Current Deficits  Safe for discharge home with family. All questions and concerns addressed. PHYSICAL THERAPY  PLAN OF CARE       Patient and or family understand(s) diagnosis, prognosis, and plan of care. Prior Level of Function: Patient ambulated independently  works, drives  Rehab Potential: good   for baseline    Past medical history:   Past Medical History:   Diagnosis Date    Cancer ([facility] [address]     breast cancer, with left masectomy in 11/2016     Past Surgical History:   Procedure Laterality Date    BREAST SURGERY         SUBJECTIVE:    Precautions: Up as tolerated, falls ,   history of breast cancer and had a mastectomy on the left side previously in 2016    Imaging results: CT ABDOMEN PELVIS W IV CONTRAST Additional Contrast? None    Result Date: 1/14/2023  EXAMINATION: CT OF THE ABDOMEN AND PELVIS WITH CONTRAST 1/14/2023 2:57 pm   1. No indication for acute intraperitoneal retroperitoneal process in the abdomen pelvis.  2.  No indication for metastatic disease in the abdomen or in the pelvis. US GALLBLADDER RUQ    Result Date: 1/14/2023  EXAMINATION: RIGHT UPPER QUADRANT ULTRASOUND 1/14/2023 3:34 pm      1. Cholelithiasis. No gallbladder wall thickening or pericholecystic fluid. Patient does report tenderness when imaging over the gallbladder. 2.  No evidence of intrahepatic or extrahepatic biliary dilatation. Social history: Patient lives with spouse in a split level home 7 steps up to main floor with rail, 7 steps down for laundry with rail   with 2 steps, with rail  to enter      Equipment owned: None,       [id number] [facility]  Mobility Inpatient   How much difficulty turning over in bed?: None  How much difficulty sitting down on / standing up from a chair with arms?: None  How much difficulty moving from lying on back to sitting on side of bed?: None  How much help from another person moving to and from a bed to a chair?: None  How much help from another person needed to walk in hospital room?: None  How much help from another person for climbing 3-5 steps with a railing?: A Little  AM-[facility] Inpatient Mobility Raw Score : 23  AM-PAC Inpatient T-Scale Score : 56.93  Mobility Inpatient CMS 0-100% Score: 11.2  Mobility Inpatient CMS G-Code Modifier : CI    Nursing cleared patient for PT evaluation. The admitting diagnosis and active problem list as listed above have been reviewed prior to the initiation of this evaluation. OBJECTIVE;   Initial Evaluation  Date: 1/16/2023   Was pt agreeable to Eval/treatment? Yes   Pain level   0/10      Bed Mobility    Rolling: Independent    Supine to sit:  Independent    Sit to supine: Independent    Scooting: Independent     Transfers Sit to stand: Independent     Ambulation     2 x 120 feet using  no device with supervision progressing to no assist   cues for safety and pacing  Seated rest due to fatigue   Stair negotiation: ascended and descended   Not assessed pt with IV   ROM Within functional limits Strength BUE:  refer to OT eval  RLE:  4+/5  LLE:  4+/5   Balance Sitting EOB:  good    Dynamic Standing:  good -     Patient is Alert & Oriented x person, place, time, and situation and follows directions    Sensation:  Patient  denies numbness/tingling   Edema:  none noted   Endurance: good  -    Vitals: room air   Blood Pressure at rest  Blood Pressure during session    Heart Rate at rest   Heart Rate during session  97   SPO2 at rest  %  SPO2 during session 97%     Patient education  Patient educated on role of Physical Therapy, risks of immobility, safety and plan of care,  importance of mobility while in hospital , and ankle pumps, quad set and glut set for edema control, blood clot prevention     Patient response to education:   Pt verbalized understanding Pt demonstrated skill Pt requires further education in this area   Yes Yes No      Treatment:  Patient practiced and was instructed/facilitated in the following treatment: Patient transferred to edge of bed,   Sat edge of bed 5 minutes with No assist to increase dynamic sitting balance and activity tolerance. Ambulated in hallway, seated rest due to slight fatigue. Ambulated back to room. Returned to bed. Therapeutic Exercises:  ankle pumps  x 10 reps. At end of session, patient in bed with spouse present call light and phone within reach,  all lines and tubes intact, nursing notified. Patient would benefit from continued skilled Physical Therapy to improve functional independence and quality of life. Patient's/ family goals   home    Time in  1012  Time out  1033    Total Treatment Time  1 minutes    Evaluation time includes thorough review of current medical information, gathering information on past medical history/social history and prior level of function, completion of standardized testing/informal observation of tasks, assessment of data, and development of Plan of care and goals.      CPT codes:  Low Complexity PT evaluation (75299)  No treatment    Geraldo Lo, PT

## 2023-01-15 NOTE — PROGRESS NOTES
General Surgery Progress Note  Reinaldo Naylor MD, MS    Patient's Name/Date of Birth: Konrad Matthew / 1960    Date: January 15, 2023     Surgeon: Quintin Nunez MD    Chief Complaint: diarrhea, abd pain    Patient Active Problem List   Diagnosis    Fever of unknown origin    Acute cholecystitis       Subjective: continues with diarrhea, nonbloody, abd pain with eating and bowel movements    Objective:  /64   Pulse 80   Temp 97.5 °F (36.4 °C) (Oral)   Resp 18   Ht 5' 6\" (1.676 m)   Wt 190 lb (86.2 kg)   SpO2 93%   BMI 30.67 kg/m²   Labs:  Recent Labs     01/14/23  1158 01/15/23  0507   WBC 5.4 4.5   HGB 14.2 11.9   HCT 42.9 34.1     Lab Results   Component Value Date    CREATININE 0.8 01/15/2023    BUN 7 01/15/2023     01/15/2023    K 2.8 (L) 01/15/2023     01/15/2023    CO2 24 01/15/2023     Recent Labs     01/14/23  1158   LIPASE 24         General appearance:  NAD  Head: NCAT, PERRLA, EOMI, red conjunctiva  Neck: supple, no masses  Lungs: CTAB, equal chest rise bilateral  Heart: Reg rate  Abdomen: soft, nondistended, nontender, obese  Skin; no lesions  Gu: no cva tenderness  Extremities: extremities normal, atraumatic, no cyanosis or edema      Assessment/Plan:  Konrad Matthew is a 65 y.o. female with abd pain, diarrhea, gallstones, poss cholecystitis    Check HIDA  Stool studies  Cont abx  IVF  Hold off on surgery unless confirmed on HIDA    Physician Signature: Electronically signed by Dr. Quintin Nunez  [phone] (p)  1/15/2023  8:23 AM

## 2023-01-15 NOTE — CARE COORDINATION
1/15/2023 CM note: Resp panel sent-await results. Met with patient for transition of care needs. Pt a/o, independent and resides with her . PCP is Dr Zuly Schofield and uses [facility]. No hx DME/[facility]. Hx of [facility], unsure of agency name. Await PT/OT evals. Pt admitted with abd pain/diarrhea,CM to follow for c diff results. Pt plans to return home at d/c.  Tenisha PERALTA

## 2023-01-15 NOTE — ACP (ADVANCE CARE PLANNING)
Advance Care Planning   Healthcare Decision Maker:    Primary Decision Maker: Alli Camp [facility] - [phone]

## 2023-01-16 ENCOUNTER — APPOINTMENT (OUTPATIENT)
Dept: NUCLEAR MEDICINE | Age: 63
DRG: 446 | End: 2023-01-16
Payer: COMMERCIAL

## 2023-01-16 LAB
ALBUMIN SERPL-MCNC: 3.8 G/DL (ref 3.5–5.2)
ALP BLD-CCNC: 99 U/L (ref 35–104)
ALT SERPL-CCNC: 32 U/L (ref 0–32)
ANION GAP SERPL CALCULATED.3IONS-SCNC: 11 MMOL/L (ref 7–16)
AST SERPL-CCNC: 27 U/L (ref 0–31)
BASOPHILS ABSOLUTE: 0.02 E9/L (ref 0–0.2)
BASOPHILS RELATIVE PERCENT: 0.4 % (ref 0–2)
BILIRUB SERPL-MCNC: 0.3 MG/DL (ref 0–1.2)
BILIRUBIN DIRECT: <0.2 MG/DL (ref 0–0.3)
BILIRUBIN, INDIRECT: NORMAL MG/DL (ref 0–1)
BUN BLDV-MCNC: 5 MG/DL (ref 6–23)
CALCIUM SERPL-MCNC: 8.7 MG/DL (ref 8.6–10.2)
CHLORIDE BLD-SCNC: 102 MMOL/L (ref 98–107)
CHOLESTEROL, TOTAL: 141 MG/DL (ref 0–199)
CO2: 21 MMOL/L (ref 22–29)
CREAT SERPL-MCNC: 0.7 MG/DL (ref 0.5–1)
EOSINOPHILS ABSOLUTE: 0.09 E9/L (ref 0.05–0.5)
EOSINOPHILS RELATIVE PERCENT: 1.8 % (ref 0–6)
GFR SERPL CREATININE-BSD FRML MDRD: >60 ML/MIN/1.73
GLUCOSE BLD-MCNC: 105 MG/DL (ref 74–99)
HBA1C MFR BLD: 5.5 % (ref 4–5.6)
HCT VFR BLD CALC: 37.6 % (ref 34–48)
HDLC SERPL-MCNC: 33 MG/DL
HEMOGLOBIN: 12.6 G/DL (ref 11.5–15.5)
IMMATURE GRANULOCYTES #: 0.01 E9/L
IMMATURE GRANULOCYTES %: 0.2 % (ref 0–5)
LDL CHOLESTEROL CALCULATED: 88 MG/DL (ref 0–99)
LYMPHOCYTES ABSOLUTE: 0.8 E9/L (ref 1.5–4)
LYMPHOCYTES RELATIVE PERCENT: 16 % (ref 20–42)
MAGNESIUM: 2.2 MG/DL (ref 1.6–2.6)
MCH RBC QN AUTO: 30.1 PG (ref 26–35)
MCHC RBC AUTO-ENTMCNC: 33.5 % (ref 32–34.5)
MCV RBC AUTO: 90 FL (ref 80–99.9)
MONOCYTES ABSOLUTE: 0.55 E9/L (ref 0.1–0.95)
MONOCYTES RELATIVE PERCENT: 11 % (ref 2–12)
NEUTROPHILS ABSOLUTE: 3.52 E9/L (ref 1.8–7.3)
NEUTROPHILS RELATIVE PERCENT: 70.6 % (ref 43–80)
PDW BLD-RTO: 13.1 FL (ref 11.5–15)
PHOSPHORUS: 1.9 MG/DL (ref 2.5–4.5)
PLATELET # BLD: 245 E9/L (ref 130–450)
PMV BLD AUTO: 9.7 FL (ref 7–12)
POTASSIUM SERPL-SCNC: 3.4 MMOL/L (ref 3.5–5)
RBC # BLD: 4.18 E12/L (ref 3.5–5.5)
SODIUM BLD-SCNC: 134 MMOL/L (ref 132–146)
T4 FREE: 1.15 NG/DL (ref 0.93–1.7)
TOTAL PROTEIN: 6.4 G/DL (ref 6.4–8.3)
TRIGL SERPL-MCNC: 102 MG/DL (ref 0–149)
TSH SERPL DL<=0.05 MIU/L-ACNC: 1.96 UIU/ML (ref 0.27–4.2)
VLDLC SERPL CALC-MCNC: 20 MG/DL
WBC # BLD: 5 E9/L (ref 4.5–11.5)

## 2023-01-16 PROCEDURE — 6360000002 HC RX W HCPCS: Performed by: SURGERY

## 2023-01-16 PROCEDURE — 87328 CRYPTOSPORIDIUM AG IA: CPT

## 2023-01-16 PROCEDURE — 97161 PT EVAL LOW COMPLEX 20 MIN: CPT | Performed by: PHYSICAL THERAPIST

## 2023-01-16 PROCEDURE — 80061 LIPID PANEL: CPT

## 2023-01-16 PROCEDURE — 84439 ASSAY OF FREE THYROXINE: CPT

## 2023-01-16 PROCEDURE — 6360000002 HC RX W HCPCS: Performed by: INTERNAL MEDICINE

## 2023-01-16 PROCEDURE — 80048 BASIC METABOLIC PNL TOTAL CA: CPT

## 2023-01-16 PROCEDURE — 84443 ASSAY THYROID STIM HORMONE: CPT

## 2023-01-16 PROCEDURE — 85025 COMPLETE CBC W/AUTO DIFF WBC: CPT

## 2023-01-16 PROCEDURE — 6370000000 HC RX 637 (ALT 250 FOR IP): Performed by: NURSE PRACTITIONER

## 2023-01-16 PROCEDURE — 3430000000 HC RX DIAGNOSTIC RADIOPHARMACEUTICAL: Performed by: RADIOLOGY

## 2023-01-16 PROCEDURE — 89055 LEUKOCYTE ASSESSMENT FECAL: CPT

## 2023-01-16 PROCEDURE — 36415 COLL VENOUS BLD VENIPUNCTURE: CPT

## 2023-01-16 PROCEDURE — A4216 STERILE WATER/SALINE, 10 ML: HCPCS | Performed by: SURGERY

## 2023-01-16 PROCEDURE — 83036 HEMOGLOBIN GLYCOSYLATED A1C: CPT

## 2023-01-16 PROCEDURE — 2580000003 HC RX 258: Performed by: SURGERY

## 2023-01-16 PROCEDURE — A9537 TC99M MEBROFENIN: HCPCS | Performed by: RADIOLOGY

## 2023-01-16 PROCEDURE — 87329 GIARDIA AG IA: CPT

## 2023-01-16 PROCEDURE — 83735 ASSAY OF MAGNESIUM: CPT

## 2023-01-16 PROCEDURE — 6370000000 HC RX 637 (ALT 250 FOR IP): Performed by: INTERNAL MEDICINE

## 2023-01-16 PROCEDURE — 2500000003 HC RX 250 WO HCPCS: Performed by: SURGERY

## 2023-01-16 PROCEDURE — 84100 ASSAY OF PHOSPHORUS: CPT

## 2023-01-16 PROCEDURE — 97165 OT EVAL LOW COMPLEX 30 MIN: CPT

## 2023-01-16 PROCEDURE — C9113 INJ PANTOPRAZOLE SODIUM, VIA: HCPCS | Performed by: SURGERY

## 2023-01-16 PROCEDURE — 80076 HEPATIC FUNCTION PANEL: CPT

## 2023-01-16 PROCEDURE — 78226 HEPATOBILIARY SYSTEM IMAGING: CPT

## 2023-01-16 PROCEDURE — 1200000000 HC SEMI PRIVATE

## 2023-01-16 PROCEDURE — 76937 US GUIDE VASCULAR ACCESS: CPT

## 2023-01-16 RX ORDER — DICYCLOMINE HYDROCHLORIDE 10 MG/1
10 CAPSULE ORAL
Status: DISCONTINUED | OUTPATIENT
Start: 2023-01-16 | End: 2023-01-17 | Stop reason: HOSPADM

## 2023-01-16 RX ORDER — CHOLESTYRAMINE 4 G/9G
1 POWDER, FOR SUSPENSION ORAL 2 TIMES DAILY
Status: DISCONTINUED | OUTPATIENT
Start: 2023-01-16 | End: 2023-01-17 | Stop reason: HOSPADM

## 2023-01-16 RX ORDER — DICYCLOMINE HYDROCHLORIDE 10 MG/1
10 CAPSULE ORAL
Status: DISCONTINUED | OUTPATIENT
Start: 2023-01-16 | End: 2023-01-16

## 2023-01-16 RX ADMIN — DICYCLOMINE HYDROCHLORIDE 10 MG: 10 CAPSULE ORAL at 19:12

## 2023-01-16 RX ADMIN — SERTRALINE 50 MG: 50 TABLET, FILM COATED ORAL at 10:07

## 2023-01-16 RX ADMIN — DICYCLOMINE HYDROCHLORIDE 10 MG: 10 CAPSULE ORAL at 14:19

## 2023-01-16 RX ADMIN — METRONIDAZOLE 500 MG: 500 INJECTION, SOLUTION INTRAVENOUS at 17:24

## 2023-01-16 RX ADMIN — POTASSIUM CHLORIDE AND SODIUM CHLORIDE: 900; 300 INJECTION, SOLUTION INTRAVENOUS at 00:34

## 2023-01-16 RX ADMIN — CEFTRIAXONE 1000 MG: 1 INJECTION, POWDER, FOR SOLUTION INTRAMUSCULAR; INTRAVENOUS at 10:53

## 2023-01-16 RX ADMIN — Medication 6 MILLICURIE: at 09:12

## 2023-01-16 RX ADMIN — LOPERAMIDE HYDROCHLORIDE 2 MG: 2 CAPSULE ORAL at 19:12

## 2023-01-16 RX ADMIN — SODIUM CHLORIDE, PRESERVATIVE FREE 10 ML: 5 INJECTION INTRAVENOUS at 10:45

## 2023-01-16 RX ADMIN — SODIUM CHLORIDE 40 MG: 9 INJECTION, SOLUTION INTRAMUSCULAR; INTRAVENOUS; SUBCUTANEOUS at 10:53

## 2023-01-16 RX ADMIN — POTASSIUM CHLORIDE AND SODIUM CHLORIDE: 900; 300 INJECTION, SOLUTION INTRAVENOUS at 14:22

## 2023-01-16 RX ADMIN — ENOXAPARIN SODIUM 40 MG: 100 INJECTION SUBCUTANEOUS at 10:07

## 2023-01-16 RX ADMIN — POTASSIUM CHLORIDE 40 MEQ: 1500 TABLET, EXTENDED RELEASE ORAL at 17:22

## 2023-01-16 RX ADMIN — CHOLESTYRAMINE 4 G: 4 POWDER, FOR SUSPENSION ORAL at 22:09

## 2023-01-16 RX ADMIN — METRONIDAZOLE 500 MG: 500 INJECTION, SOLUTION INTRAVENOUS at 10:54

## 2023-01-16 RX ADMIN — METRONIDAZOLE 500 MG: 500 INJECTION, SOLUTION INTRAVENOUS at 02:12

## 2023-01-16 ASSESSMENT — PAIN SCALES - GENERAL
PAINLEVEL_OUTOF10: 0
PAINLEVEL_OUTOF10: 5
PAINLEVEL_OUTOF10: 0
PAINLEVEL_OUTOF10: 0

## 2023-01-16 ASSESSMENT — PAIN DESCRIPTION - LOCATION
LOCATION: ABDOMEN
LOCATION: ABDOMEN

## 2023-01-16 NOTE — PROGRESS NOTES
GENERAL SURGERY  DAILY PROGRESS NOTE  1/16/2023    Chief Complaint   Patient presents with    Diarrhea     Per patient since last Saturday called PCP Thursday and was told to come to ED. Stated that every goes crazy after she eats       Subjective:  Feeling ok today, no pain.     Objective:  /75   Pulse 87   Temp 99.6 °F (37.6 °C) (Oral)   Resp 16   Ht 5' 6\" (1.676 m)   Wt 190 lb (86.2 kg)   SpO2 91%   BMI 30.67 kg/m²     GENERAL:  Laying in bed, awake, alert, cooperative, no apparent distress  HEAD: Normocephalic, atraumatic  EYES: No sclera icterus, pupils equal  LUNGS:  No increased work of breathing  CARDIOVASCULAR:  RR  ABDOMEN:  Soft, non-tender, non-distended  EXTREMITIES: No edema or swelling  SKIN: Warm and dry    Assessment/Plan:  65 y.o. female abd pain, diarrhea, gallstones, poss cholecystitis    - MILAGROS today  - NPO for scan  - further plans pending results     Electronically signed by Janice Mendoza MD on 1/16/2023 at 6:25 AM      As above  MILAGROS

## 2023-01-16 NOTE — PROGRESS NOTES
[address] [facility]  [facility] [place]. [place]        Date:2023                                                  Patient Name: Leonard Rosales    MRN: [id number]    : 1960    Room: [address] [place], [place] [id number]      Evaluating OT: Rosy Echevarria OTR/L; [id number]     Referring Provider and Specific Provider Orders/Date:      01/15/23 0715  OT eval and treat  Start:  01/15/23 0715,   End:  01/15/23 0715,   ONE TIME,   Standing Count:  1 Occurrences,   R         Ricardo Villar, APRN - CNP      Placement Recommendation: Home with no skilled occupational therapy needed after discharge from inpatient. Diagnosis:   1. Acute cholecystitis    2. Transaminitis         Surgery: none       Pertinent Medical History:       Past Medical History:   Diagnosis Date    Cancer ([facility] [address]     breast cancer, with left masectomy in 2016         Past Surgical History:   Procedure Laterality Date    BREAST SURGERY       Precautions: none     NM HEPATOBILIARY 23  The common bile duct and cystic duct are patent. No acute cholecystitis. US GALLBLADDER RUQ 23  1. Cholelithiasis. No gallbladder wall thickening or pericholecystic fluid. Patient does report tenderness when imaging over the gallbladder. 2.  No evidence of intrahepatic or extrahepatic biliary dilatation. CT ABDOMEN PELVIS W IV CONTRAST 23  1. No indication for acute intraperitoneal retroperitoneal process in the abdomen pelvis. 2.  No indication for metastatic disease in the abdomen or in the pelvis.      Assessment of current deficits: none      [] Functional mobility  []ADLs  [] Strength               []Cognition    [] Functional transfers   [] IADLs         [] Safety Awareness   []Endurance    [] Fine Coordination              [] Balance      [] Vision/perception   []Sensation     []Gross Motor Coordination  [] ROM  [] Delirium                   [] Motor Control OT PLAN OF CARE   OT POC based on physician orders, patient diagnosis and results of clinical assessment    Frequency/Duration 1-3 days/wk for 2 weeks PRN     Specific OT Treatment Interventions to include:   None - will discharge from OT    Recommended Adaptive Equipment: none     Home Living: with spouse; single family home, split level, 2 steps to enter with rail, 7 steps up to bedroom and bathroom/walk in shower, 6 steps down to lower level       Equipment owned: none     Prior Level of Function: Independent with ADLs , Independent with IADLs; ambulated with no device    Driving: yes    Pain Level: no pain, abdominal discomfort; Nursing notified. Cognition: A&O: 4/4; Follows 3 step directions   Memory: good    Sequencing: good    Problem solving: good    Judgement/safety: good     [facility]   AM-PAC Daily Activity Inpatient   How much help for putting on and taking off regular lower body clothing?: None  How much help for Bathing?: None  How much help for Toileting?: None  How much help for putting on and taking off regular upper body clothing?: None  How much help for taking care of personal grooming?: None  How much help for eating meals?: None  AM-[facility] Inpatient Daily Activity Raw Score: 24  AM-PAC Inpatient ADL T-Scale Score : 57.54  ADL Inpatient CMS 0-100% Score: 0  ADL Inpatient CMS G-Code Modifier : CH     Functional Assessment:    Initial Eval Status  Date: 1/16/23   Feeding Independent    Grooming Independent    UB Dressing Independent    LB Dressing Independent to doff socks at EOB   Bathing Independent   Toileting Independent    Bed Mobility  Supine to sit: Independent   Sit to supine: Independent    Functional Transfers Independent from EOB. Independent for transfer to and from low commode without the need of grab bars. Independent for transfer from standing to edge of bed. Transfer training with verbal cues for hand placement throughout session to improve safety.     Functional Mobility Independent with no device to and from bathroom and within the room for household distance   Balance Sitting:     Static: good     Dynamic: good   Standing: good  with no device   Activity Tolerance good    Visual/  Perceptual Glasses: yes             Hand Dominance: right      AROM (PROM) Strength Additional Info:    RUE  WFL 5/5 good  and wfl FMC/dexterity noted during ADL tasks     LUE WFL 5/5 good  and wfl FMC/dexterity noted during ADL tasks       Hearing: [facility]   Sensation:  No c/o numbness or tingling  Tone: WFL   Edema: no    Comments: Upon arrival the patient was supine. At end of session, patient was returned to supine with call light and phone within reach, all lines and tubes intact. Treatment: OT treatment provided this date includes: Evaluation only     Rehab Potential: Good for established goals. Patient / Family Goal: take a shower       Patient and/or family were instructed on functional diagnosis, prognosis/goals and OT plan of care. Demonstrated good understanding. Eval Complexity: Low    Time In: 2:10pm  Time Out: 2:40pm    Total Treatment Time: 0      Min Units   OT Eval Low 97165  X  1    OT Eval Medium 21980      OT Eval High 21035      OT Re-Eval [id number]            ADL/Self Care 29081     Therapeutic Activities 54417     Therapeutic Ex 01746       Orthotic Management 45732       Manual 19358     Neuro Re-Ed 51407       Non-Billable Time        Evaluation Time additionally includes thorough review of current medical information, gathering information on past medical history/social history and prior level of function, interpretation of standardized testing/informal observation of tasks, assessment of data and development of plan of care and goals.         Evaluating OT: Justina Arevalo OTR/L; [id number]

## 2023-01-16 NOTE — PROGRESS NOTES
Internal Medicine Progress Note    [facility]    Radhakarely Noyola. Norah Dennis., SHANIQUA. Luciano Gonzalez D.O., CATIE Wilson D.O. Anai Anderson, MSN, APRN, NP-C  Shaun Dietz. [facility], MSN, APRN-CNP     Primary Care Physician: Salo Watson MD   Admitting Physician:  Dana Hines MD  Admission date and time: 1/14/2023 11:13 AM    Room:  [address] [place] [address]  Admitting diagnosis: Acute cholecystitis [K81.0]  Transaminitis [R74.01]    Patient Name: Pablo Herman  MRN: [id number]    Date of Service: 1/16/2023     Subjective:  Nurys Robles is a 65 y.o. female who was seen and examined today,1/16/2023, at the bedside. She continues to have diarrhea and abdominal cramping. We have discussed plan for HIDA scan today and for treatment plan to be dictated based upon results. Case was discussed with the general surgery team yesterday as well. No family present during my examination. Review of System:   Constitutional:   Denies fever or chills, weight loss or gain, fatigue or malaise. HEENT:   Denies ear pain, sore throat, sinus or eye problems. Cardiovascular:   Denies any chest pain, irregular heartbeats, or palpitations. Respiratory:   Denies shortness of breath, coughing, sputum production, hemoptysis, or wheezing. Gastrointestinal:   Admits to mild abdominal cramping with no significant pain. No nausea or vomiting. Tolerated limited diet. Continues to have loose bowel movements. Genitourinary:    Denies any urgency, frequency, hematuria. Voiding  without difficulty. Extremities:   Denies lower extremity swelling, edema or cyanosis. Neurology:    Denies any headache or focal neurological deficits, Denies generalized weakness or memory difficulty. Psch:   Denies being anxious or depressed. Musculoskeletal:    Denies  myalgias, joint complaints or back pain. Integumentary:   Denies any rashes, ulcers, or excoriations.   Denies bruising. Hematologic/Lymphatic:  Denies bruising or bleeding. Physical Exam:  No intake/output data recorded. No intake or output data in the 24 hours ending 01/16/23 0715I/O last 3 completed shifts: In: 1476.2 [P.O.:240; I.V.:1036.2; IV Piggyback:200]  Out: 300 [Stool:300]  Patient Vitals for the past 96 hrs (Last 3 readings):   Weight   01/14/23 1115 190 lb (86.2 kg)     Vital Signs:   Blood pressure (!) 158/93, pulse 83, temperature 98.8 °F (37.1 °C), temperature source Oral, resp. rate 17, height 5' 6\" (1.676 m), weight 190 lb (86.2 kg), SpO2 98 %. General appearance:  Alert, responsive, oriented to person, place, and time. Comfortable, no distress. Head:  Normocephalic. No masses, lesions or tenderness. Eyes:  PERRLA. EOMI. Sclera clear. ENT:  Ears normal. Mucosa normal.  Neck:    Supple. Trachea midline. No thyromegaly. No JVD. No bruits. Heart:    Rhythm regular. Rate controlled. S1 and S2  Lungs:    Symmetrical. Clear to auscultation bilaterally. No wheezes. No rhonchi. No rales. Abdomen:   Soft. Obese. Non-tender. Non-distended. Bowel sounds positive. No organomegaly or masses. No pain on palpation. Extremities:    Peripheral pulses present. No significant pitting peripheral edema. No ulcers. No cyanosis. No clubbing. Neurologic:    Alert x 3. No focal deficit. Cranial nerves grossly intact. No focal weakness. Psych:   Behavior is normal. Mood appears normal. Speech is not rapid and/or pressured. Musculoskeletal:   No unilateral joint edema, erythema, or warmth. Gait not assessed. Integumentary:  No rashes  Skin normal color and texture.   Genitalia/Breast:  Deferred    Medication:  Scheduled Meds:   dicyclomine  10 mg Oral 4x Daily AC & HS    sertraline  50 mg Oral Daily    sodium chloride flush  10 mL IntraVENous 2 times per day    metroNIDAZOLE  500 mg IntraVENous Q8H    cefTRIAXone (ROCEPHIN) IV  1,000 mg IntraVENous Daily    pantoprazole (PROTONIX) 40 mg injection  40 mg IntraVENous Daily    enoxaparin  40 mg SubCUTAneous Daily     Continuous Infusions:   0.9% NaCl with KCl 40 mEq 125 mL/hr at 01/16/23 0034    sodium chloride         Objective Data:  CBC with Differential:    Lab Results   Component Value Date/Time    WBC 4.5 01/15/2023 05:07 AM    RBC 3.85 01/15/2023 05:07 AM    HGB 11.9 01/15/2023 05:07 AM    HCT 34.1 01/15/2023 05:07 AM     01/15/2023 05:07 AM    MCV 88.6 01/15/2023 05:07 AM    MCH 30.9 01/15/2023 05:07 AM    MCHC 34.9 01/15/2023 05:07 AM    RDW 13.0 01/15/2023 05:07 AM    NRBC 1.0 01/07/2017 09:13 AM    METASPCT 2.0 01/07/2017 09:13 AM    LYMPHOPCT 16.9 01/15/2023 05:07 AM    PROMYELOPCT 1.5 01/07/2017 09:13 AM    MONOPCT 11.6 01/15/2023 05:07 AM    MYELOPCT 3.5 01/07/2017 09:13 AM    BASOPCT 0.2 01/15/2023 05:07 AM    MONOSABS 0.52 01/15/2023 05:07 AM    LYMPHSABS 0.76 01/15/2023 05:07 AM    EOSABS 0.08 01/15/2023 05:07 AM    BASOSABS 0.01 01/15/2023 05:07 AM     CMP:    Lab Results   Component Value Date/Time     01/15/2023 05:07 AM    K 2.8 01/15/2023 05:07 AM     01/15/2023 05:07 AM    CO2 24 01/15/2023 05:07 AM    BUN 7 01/15/2023 05:07 AM    CREATININE 0.8 01/15/2023 05:07 AM    GFRAA >60 01/07/2017 09:13 AM    LABGLOM >60 01/15/2023 05:07 AM    GLUCOSE 98 01/15/2023 05:07 AM    PROT 5.8 01/15/2023 05:07 AM    LABALBU 3.5 01/15/2023 05:07 AM    CALCIUM 8.3 01/15/2023 05:07 AM    BILITOT 0.3 01/15/2023 05:07 AM    ALKPHOS 93 01/15/2023 05:07 AM    AST 24 01/15/2023 05:07 AM    ALT 33 01/15/2023 05:07 AM       Wound Documentation:    See documentation    Assessment:  Acute, likely superimposed on chronic, calculus cholecystitis with resultant diarrhea  Multiple electrolyte disturbance secondary to #1  Prior history of breast cancer status post mastectomy and systemic therapy    Plan:   Watson Late continues to have loose bowel movements but no pain. C. difficile testing returned negative as did respiratory film array.   We await labs from today to determine the need for additional electrolyte replacement. We will continue with symptomatic supportive care, IV fluids. HIDA scan today. If negative, we may need to consider GI consultation. Bentyl will be added following HIDA scan +/- cholestyramine. Chronic morbidities, labs and vital signs being monitored and addressed accordingly. Continue current therapy. See orders for further plan of care. More than 50% of my  time was spent at the bedside counseling/coordinating care with the patient and/or family with face to face contact. This time was spent reviewing notes and laboratory data as well as instructing and counseling the patient. Time I spent with the family or surrogate(s) is included only if the patient was incapable of providing the necessary information or participating in medical decisions. I also discussed the differential diagnosis and all of the proposed management plans with the patient and individuals accompanying the patient. Ronda Alexis requires this high level of physician care and nursing on the IMC/Telemetry unit due the complexity of decision management and chance of rapid decline or death. Continued cardiac monitoring and higher level of nursing are required. I am readily available for any further decision-making and intervention.      PRATEEK Clark - CNP  1/16/2023  7:15 AM

## 2023-01-16 NOTE — CARE COORDINATION
1/16/2023 1107 CM note; Negative covid 1/15/23. Pt admitted with abd pain/diarrhea, negative c diff. Per IDR, pt for HIDA scan today. Await PT/OT evals. Pt plans to return home at d/c.  Tneisha PERALTA

## 2023-01-16 NOTE — PROGRESS NOTES
Date:2023  Patient Name: Jannie Wellington  MRN: [id number]  : 1960  ROOM #: [id number]    Occupational Therapy order received, chart reviewed and evaluation attempted this date. Patient is unavailable for OT evaluation due to ambulating in hallway with physical therapy.     Will attempt OT evaluation at a later time. Thank you.   Tamika Rodas OTR/L #[id number]

## 2023-01-17 VITALS
BODY MASS INDEX: 30.53 KG/M2 | OXYGEN SATURATION: 98 % | HEART RATE: 80 BPM | TEMPERATURE: 98.6 F | RESPIRATION RATE: 16 BRPM | SYSTOLIC BLOOD PRESSURE: 138 MMHG | WEIGHT: 190 LBS | DIASTOLIC BLOOD PRESSURE: 81 MMHG | HEIGHT: 66 IN

## 2023-01-17 LAB
ALBUMIN SERPL-MCNC: 3.4 G/DL (ref 3.5–5.2)
ALP BLD-CCNC: 91 U/L (ref 35–104)
ALT SERPL-CCNC: 31 U/L (ref 0–32)
ANION GAP SERPL CALCULATED.3IONS-SCNC: 9 MMOL/L (ref 7–16)
AST SERPL-CCNC: 36 U/L (ref 0–31)
BASOPHILS ABSOLUTE: 0.01 E9/L (ref 0–0.2)
BASOPHILS RELATIVE PERCENT: 0.3 % (ref 0–2)
BILIRUB SERPL-MCNC: 0.3 MG/DL (ref 0–1.2)
BILIRUBIN DIRECT: <0.2 MG/DL (ref 0–0.3)
BILIRUBIN, INDIRECT: ABNORMAL MG/DL (ref 0–1)
BUN BLDV-MCNC: 4 MG/DL (ref 6–23)
CALCIUM SERPL-MCNC: 8.3 MG/DL (ref 8.6–10.2)
CHLORIDE BLD-SCNC: 110 MMOL/L (ref 98–107)
CO2: 22 MMOL/L (ref 22–29)
CREAT SERPL-MCNC: 0.7 MG/DL (ref 0.5–1)
EOSINOPHILS ABSOLUTE: 0.08 E9/L (ref 0.05–0.5)
EOSINOPHILS RELATIVE PERCENT: 2.1 % (ref 0–6)
GFR SERPL CREATININE-BSD FRML MDRD: >60 ML/MIN/1.73
GIARDIA ANTIGEN STOOL: NORMAL
GLUCOSE BLD-MCNC: 94 MG/DL (ref 74–99)
HCT VFR BLD CALC: 35.7 % (ref 34–48)
HEMOGLOBIN: 12.1 G/DL (ref 11.5–15.5)
IMMATURE GRANULOCYTES #: 0.02 E9/L
IMMATURE GRANULOCYTES %: 0.5 % (ref 0–5)
LYMPHOCYTES ABSOLUTE: 0.84 E9/L (ref 1.5–4)
LYMPHOCYTES RELATIVE PERCENT: 22.4 % (ref 20–42)
MAGNESIUM: 2.1 MG/DL (ref 1.6–2.6)
MCH RBC QN AUTO: 30.9 PG (ref 26–35)
MCHC RBC AUTO-ENTMCNC: 33.9 % (ref 32–34.5)
MCV RBC AUTO: 91.3 FL (ref 80–99.9)
MONOCYTES ABSOLUTE: 0.46 E9/L (ref 0.1–0.95)
MONOCYTES RELATIVE PERCENT: 12.3 % (ref 2–12)
NEUTROPHILS ABSOLUTE: 2.34 E9/L (ref 1.8–7.3)
NEUTROPHILS RELATIVE PERCENT: 62.4 % (ref 43–80)
PDW BLD-RTO: 13.4 FL (ref 11.5–15)
PHOSPHORUS: 1.7 MG/DL (ref 2.5–4.5)
PLATELET # BLD: 234 E9/L (ref 130–450)
PMV BLD AUTO: 9.6 FL (ref 7–12)
POTASSIUM SERPL-SCNC: 4.4 MMOL/L (ref 3.5–5)
RBC # BLD: 3.91 E12/L (ref 3.5–5.5)
SODIUM BLD-SCNC: 141 MMOL/L (ref 132–146)
TOTAL PROTEIN: 6 G/DL (ref 6.4–8.3)
WBC # BLD: 3.8 E9/L (ref 4.5–11.5)
WHITE BLOOD CELLS (WBC), STOOL: NORMAL

## 2023-01-17 PROCEDURE — 6370000000 HC RX 637 (ALT 250 FOR IP): Performed by: SURGERY

## 2023-01-17 PROCEDURE — 85025 COMPLETE CBC W/AUTO DIFF WBC: CPT

## 2023-01-17 PROCEDURE — 84100 ASSAY OF PHOSPHORUS: CPT

## 2023-01-17 PROCEDURE — 6370000000 HC RX 637 (ALT 250 FOR IP): Performed by: INTERNAL MEDICINE

## 2023-01-17 PROCEDURE — 80048 BASIC METABOLIC PNL TOTAL CA: CPT

## 2023-01-17 PROCEDURE — 2580000003 HC RX 258: Performed by: SURGERY

## 2023-01-17 PROCEDURE — 6370000000 HC RX 637 (ALT 250 FOR IP): Performed by: NURSE PRACTITIONER

## 2023-01-17 PROCEDURE — 83735 ASSAY OF MAGNESIUM: CPT

## 2023-01-17 PROCEDURE — 80076 HEPATIC FUNCTION PANEL: CPT

## 2023-01-17 PROCEDURE — 6360000002 HC RX W HCPCS: Performed by: SURGERY

## 2023-01-17 PROCEDURE — 2500000003 HC RX 250 WO HCPCS: Performed by: SURGERY

## 2023-01-17 PROCEDURE — C9113 INJ PANTOPRAZOLE SODIUM, VIA: HCPCS | Performed by: SURGERY

## 2023-01-17 PROCEDURE — A4216 STERILE WATER/SALINE, 10 ML: HCPCS | Performed by: SURGERY

## 2023-01-17 PROCEDURE — 36415 COLL VENOUS BLD VENIPUNCTURE: CPT

## 2023-01-17 RX ORDER — LOPERAMIDE HYDROCHLORIDE 2 MG/1
2 CAPSULE ORAL 4 TIMES DAILY PRN
Qty: 30 CAPSULE | Refills: 0 | Status: SHIPPED | OUTPATIENT
Start: 2023-01-17 | End: 2023-01-17 | Stop reason: SDUPTHER

## 2023-01-17 RX ORDER — LOPERAMIDE HYDROCHLORIDE 2 MG/1
2 CAPSULE ORAL 4 TIMES DAILY PRN
Qty: 30 CAPSULE | Refills: 0 | Status: SHIPPED | OUTPATIENT
Start: 2023-01-17 | End: 2023-01-27

## 2023-01-17 RX ORDER — CIPROFLOXACIN 500 MG/1
500 TABLET, FILM COATED ORAL 2 TIMES DAILY
Qty: 10 TABLET | Refills: 0 | Status: SHIPPED | OUTPATIENT
Start: 2023-01-17 | End: 2023-01-17 | Stop reason: SDUPTHER

## 2023-01-17 RX ORDER — CHOLESTYRAMINE 4 G/9G
1 POWDER, FOR SUSPENSION ORAL 2 TIMES DAILY
Qty: 90 PACKET | Refills: 3 | Status: SHIPPED | OUTPATIENT
Start: 2023-01-17

## 2023-01-17 RX ORDER — VITAMIN A 3000 MCG
300 CAPSULE ORAL DAILY
Qty: 30 CAPSULE | Refills: 0 | Status: SHIPPED | OUTPATIENT
Start: 2023-01-17

## 2023-01-17 RX ORDER — CHOLESTYRAMINE 4 G/9G
1 POWDER, FOR SUSPENSION ORAL 2 TIMES DAILY
Qty: 90 PACKET | Refills: 3 | Status: SHIPPED | OUTPATIENT
Start: 2023-01-17 | End: 2023-01-17 | Stop reason: SDUPTHER

## 2023-01-17 RX ORDER — METRONIDAZOLE 500 MG/1
500 TABLET ORAL EVERY 8 HOURS SCHEDULED
Status: DISCONTINUED | OUTPATIENT
Start: 2023-01-17 | End: 2023-01-17 | Stop reason: HOSPADM

## 2023-01-17 RX ORDER — CIPROFLOXACIN 500 MG/1
500 TABLET, FILM COATED ORAL 2 TIMES DAILY
Qty: 10 TABLET | Refills: 0 | Status: SHIPPED | OUTPATIENT
Start: 2023-01-17 | End: 2023-01-22

## 2023-01-17 RX ORDER — METRONIDAZOLE 500 MG/1
500 TABLET ORAL 3 TIMES DAILY
Qty: 15 TABLET | Refills: 0 | Status: SHIPPED | OUTPATIENT
Start: 2023-01-17 | End: 2023-01-17 | Stop reason: SDUPTHER

## 2023-01-17 RX ORDER — CIPROFLOXACIN 500 MG/1
500 TABLET, FILM COATED ORAL EVERY 12 HOURS SCHEDULED
Status: DISCONTINUED | OUTPATIENT
Start: 2023-01-17 | End: 2023-01-17 | Stop reason: HOSPADM

## 2023-01-17 RX ORDER — METRONIDAZOLE 500 MG/1
500 TABLET ORAL 3 TIMES DAILY
Qty: 15 TABLET | Refills: 0 | Status: SHIPPED | OUTPATIENT
Start: 2023-01-17 | End: 2023-01-22

## 2023-01-17 RX ADMIN — CIPROFLOXACIN 500 MG: 500 TABLET, FILM COATED ORAL at 09:08

## 2023-01-17 RX ADMIN — LOPERAMIDE HYDROCHLORIDE 2 MG: 2 CAPSULE ORAL at 01:13

## 2023-01-17 RX ADMIN — CHOLESTYRAMINE 4 G: 4 POWDER, FOR SUSPENSION ORAL at 09:08

## 2023-01-17 RX ADMIN — DICYCLOMINE HYDROCHLORIDE 10 MG: 10 CAPSULE ORAL at 06:24

## 2023-01-17 RX ADMIN — METRONIDAZOLE 500 MG: 500 INJECTION, SOLUTION INTRAVENOUS at 01:14

## 2023-01-17 RX ADMIN — LOPERAMIDE HYDROCHLORIDE 2 MG: 2 CAPSULE ORAL at 07:21

## 2023-01-17 RX ADMIN — POTASSIUM PHOSPHATE, MONOBASIC AND POTASSIUM PHOSPHATE, DIBASIC 20 MMOL: 224; 236 INJECTION, SOLUTION, CONCENTRATE INTRAVENOUS at 06:30

## 2023-01-17 RX ADMIN — DICYCLOMINE HYDROCHLORIDE 10 MG: 10 CAPSULE ORAL at 10:47

## 2023-01-17 RX ADMIN — SERTRALINE 50 MG: 50 TABLET, FILM COATED ORAL at 09:11

## 2023-01-17 RX ADMIN — SODIUM CHLORIDE 40 MG: 9 INJECTION, SOLUTION INTRAMUSCULAR; INTRAVENOUS; SUBCUTANEOUS at 09:08

## 2023-01-17 RX ADMIN — METRONIDAZOLE 500 MG: 500 TABLET ORAL at 14:17

## 2023-01-17 RX ADMIN — LOPERAMIDE HYDROCHLORIDE 2 MG: 2 CAPSULE ORAL at 14:17

## 2023-01-17 NOTE — DISCHARGE INSTRUCTIONS
Learning About Gallstones  What are gallstones? Gallstones are stones that form in the gallbladder. The gallbladder is a small sac located just under the liver. It stores bile released by the liver. Bile helps you digest fats. Gallstones can be smaller than a grain of sand or as large as a golf ball. Gallstones form when cholesterol and other things found in bile make stones. They can also form if the gallbladder doesn't empty as it should. Gallstones can also form in the common bile duct or cystic duct. These tubes carry bile from the gallbladder and the liver to the small intestine. What happens when you have gallstones? The progression of gallstones depends on whether you have symptoms. Most people with gallstones have no symptoms and do not need treatment. The most common problem caused by gallstones occurs when a gallstone blocks the cystic duct that drains the gallbladder. It often causes bouts of pain that come and go as the gallbladder contracts and expands. The bouts of pain are often severe and steady. The pain can last from 15 minutes to up to 6 hours. And the pain may get worse after a meal. Symptoms usually improve within a few days. If the pain is severe or if you have had gallbladder pain before, you may need to have your gallbladder removed. In rare cases, gallstones can cause pancreatitis, an inflammation of the pancreas. Gallstones back up the flow of digestive enzymes made by the pancreas. Pancreatitis may cause sudden, severe belly pain, loss of appetite, nausea and vomiting, and fever. What are the symptoms? Most people who have gallstones don't have symptoms. When symptoms occur, they can include:  Pain in the pit of your stomach or in the upper right part of your belly. It may spread to your right upper back or shoulder blade area. Pain that may come and go or be steady. It may get worse when you eat.   Fever and chills, if a gallstone is blocking a bile duct and causing an infection. Yellowing of your skin and the whites of your eyes. Pain can last 15 minutes to 24 hours. Continuous pain for 1 to 5 hours is common. The pain may begin at night and be severe enough to wake you. Pain often starts after eating food that is high in fat. The pain usually makes it hard to get comfortable. Moving around doesn't make the pain go away. How can you prevent gallstones? There is no sure way to prevent gallstones. But you can reduce your risk of forming gallstones that can cause symptoms. Stay at a healthy weight. If you need to lose weight, do so slowly and sensibly. Eat regular, balanced meals. Be active, and exercise regularly. How are gallstones treated? If you don't have symptoms, you probably don't need treatment. For mild symptoms, your doctor may have you take pain medicine and wait to see if the pain goes away. For severe pain or infection, or if you have more than one gallstone attack, your doctor may suggest surgery to have your gallbladder removed. The body works fine without a gallbladder. Follow-up care is a key part of your treatment and safety. Be sure to make and go to all appointments, and call your doctor if you are having problems. It's also a good idea to know your test results and keep a list of the medicines you take. Where can you learn more? Go to http://www.woods.com/ and enter I524 to learn more about \"Learning About Gallstones. \"  Current as of: June 6, 2022               Content Version: 13.5  © 2006-2022 [employer]. Care instructions adapted under license by [facility] ([facility]). If you have questions about a medical condition or this instruction, always ask your healthcare professional. [place] [address] any warranty or liability for your use of this information. Diarrhea: Care Instructions  Overview     Diarrhea is loose, watery stools (bowel movements). The exact cause is often hard to find.  Sometimes diarrhea is your body's way of getting rid of what caused an upset stomach. Viruses, food poisoning, and many medicines can cause diarrhea. Some people get diarrhea in response to emotional stress, anxiety, or certain foods. Almost everyone has diarrhea now and then. It usually isn't serious, and your stools will return to normal soon. The important thing to do is replace the fluids you have lost, so you can prevent dehydration. The doctor has checked you carefully, but problems can develop later. If you notice any problems or new symptoms, get medical treatment right away. Follow-up care is a key part of your treatment and safety. Be sure to make and go to all appointments, and call your doctor if you are having problems. It's also a good idea to know your test results and keep a list of the medicines you take. How can you care for yourself at home? Watch for signs of dehydration, which means your body has lost too much water. Dehydration is a serious condition and should be treated right away. Signs of dehydration are:  Increasing thirst and dry eyes and mouth. Feeling faint or lightheaded. A smaller amount of urine than normal.  To prevent dehydration, drink plenty of fluids. Choose water and other clear liquids until you feel better. If you have kidney, heart, or liver disease and have to limit fluids, talk with your doctor before you increase the amount of fluids you drink. When you feel like eating, start with small amounts of food. The doctor may recommend that you take over-the-counter medicine, such as loperamide (Imodium). Read and follow all instructions on the label. Do not use this medicine if you have bloody diarrhea, a high fever, or other signs of serious illness. Call your doctor if you think you are having a problem with your medicine. When should you call for help? Call 911 anytime you think you may need emergency care. For example, call if:    You passed out (lost consciousness). Your stools are maroon or very bloody. Call your doctor now or seek immediate medical care if:    You are dizzy or lightheaded, or you feel like you may faint. Your stools are black and look like tar, or they have streaks of blood. You have new or worse belly pain. You have symptoms of dehydration, such as:  Dry eyes and a dry mouth. Passing only a little urine. Cannot keep fluids down. You have a new or higher fever. Watch closely for changes in your health, and be sure to contact your doctor if:    Your diarrhea is getting worse. You see pus in the diarrhea. You are not getting better after 2 days (48 hours). Where can you learn more? Go to http://www.woods.com/ and enter W335 to learn more about \"Diarrhea: Care Instructions. \"  Current as of: 2022               Content Version: 13.5   [employer]. Care instructions adapted under license by [facility] ([facility]). If you have questions about a medical condition or this instruction, always ask your healthcare professional. [address] any warranty or liability for your use of this information. Your information:  Name: Akilah Mercado  : 1960    Your instructions:    Discharged home. Please make and keep any follow up appointments. If you have increased shortness of breath, increased cough or sputum production, have increased weakness, become dizzy, fall or pass out, have nausea or vomiting, fever or chills, please call Dr. Renato Jean or return to the emergency room. What to do after you leave the hospital:    Recommended diet: low fat, low cholesterol diet    Recommended activity: activity as tolerated        The following personal items were collected during your admission and were returned to you:    Belongings  Dental Appliances: None  Vision - Corrective Lenses: Eyeglasses, At bedside  Hearing Aid: None  Clothing:  Footwear, Undergarments, Jacket/Coat, Pants, Shirt  Jewelry: None  Body Piercings Removed: N/A  Electronic Devices: Cell Phone, Tablet, , At bedside  Weapons (Notify Protective Services/Security): None  Other Valuables: At bedside, Other (Comment) (id and insurance, book and pillow)  Home Medications: None  Valuables Given To: Patient  Provide Name(s) of Who Valuable(s) Were Given To: patient    Information obtained by:  By signing below, I understand that if any problems occur once I leave the hospital I am to contact Dr. Jesus Hernandez. I understand and acknowledge receipt of the instructions indicated above.

## 2023-01-17 NOTE — PROGRESS NOTES
GENERAL SURGERY  DAILY PROGRESS NOTE  1/17/2023    Chief Complaint   Patient presents with    Diarrhea     Per patient since last Saturday called PCP Thursday and was told to come to ED. Stated that every goes crazy after she eats       Subjective:  Still with diarrhea after meals yesterday but last night seemed to improve after imodium. No abdominal pain.  HIDA negative yesterday    Objective:  /75   Pulse 81   Temp 98.7 °F (37.1 °C) (Oral)   Resp 16   Ht 5' 6\" (1.676 m)   Wt 190 lb (86.2 kg)   SpO2 97%   BMI 30.67 kg/m²     GENERAL:  Laying in bed, awake, alert, cooperative, no apparent distress  HEAD: Normocephalic, atraumatic  EYES: No sclera icterus, pupils equal  LUNGS:  No increased work of breathing  CARDIOVASCULAR:  RR  ABDOMEN:  Soft, non-tender, non-distended  EXTREMITIES: No edema or swelling  SKIN: Warm and dry    Assessment/Plan:  65 y.o. female abd pain, diarrhea, gallstones, negative HIDA    - continue symptomatic diarrheal management   -appreciate GI input  - continue diet as tolerated  - if diarrhea improves and GI not planning inpatient scope, can likely discharge home soon    Electronically signed by Jeevan Simon MD on 1/17/2023 at 6:07 AM      General Surgery Progress Note  Isaiah Keller MD, MS    Patient's Name/Date of Birth: Sarah Jones / 1960    Date: January 17, 2023     Surgeon: Ana Paula Blanco MD    Chief Complaint: diarrhea    Patient Active Problem List   Diagnosis    Fever of unknown origin    Acute cholecystitis       Subjective: improved, still with diarrhea intermittent but none overnight    Objective:  /81   Pulse 80   Temp 98.6 °F (37 °C) (Oral)   Resp 16   Ht 5' 6\" (1.676 m)   Wt 190 lb (86.2 kg)   SpO2 98%   BMI 30.67 kg/m²   Labs:  Recent Labs     01/15/23  0507 01/16/23  0628 01/17/23  0612   WBC 4.5 5.0 3.8*   HGB 11.9 12.6 12.1   HCT 34.1 37.6 35.7     Lab Results   Component Value Date    CREATININE 0.7 01/17/2023 BUN 4 (L) 01/17/2023     01/17/2023    K 4.4 01/17/2023     (H) 01/17/2023    CO2 22 01/17/2023     Recent Labs     01/14/23  1158   LIPASE 24         General appearance:  NAD  Head: NCAT, PERRLA, EOMI, red conjunctiva  Neck: supple, no masses  Lungs: Equal chest rise bilateral  Heart: Reg rate  Abdomen: soft, nondistended, nontender  Skin; no lesions  Gu: no cva tenderness  Extremities: extremities normal, atraumatic, no cyanosis or edema      Assessment/Plan:  Darell Taveras is a 65 y.o. female with diarrhea    HIDA neg  Still with stones and may ultimately be source  Stool cultures neg  Ok to DC from my standpoint and follow up in 1-2 weeks to likely schedule outpatient lap dariel if not improved with immodium and questran  She agrees  Curry Oil 7 days of oral abx    Physician Signature: Electronically signed by Dr. Hilary Rodriguez  [phone] (p)  1/17/2023  8:41 AM

## 2023-01-17 NOTE — DISCHARGE SUMMARY
Internal Medicine Progress Note     [facility]     Jersey Vargas. Paul Bueno., JESUSOJorgeI. Allan Mcmahon D.O., CATIE Diehl D.O. Jorge Alberto Duarte, MSN, APRN, NP-C  Louisa Covington. Janki Mesa, MSN, [id number] [facility]       Internal Medicine  Discharge Summary    NAME: Isabel Hager  :  1960  MRN:  [id number]  Amparo Harman MD  ADMITTED: 2023      DISCHARGED: 23    ADMITTING PHYSICIAN: Gaviota Peguero MD    CONSULTANT(S):   IP CONSULT TO HOSPITALIST  IP CONSULT TO SOCIAL WORK  IP CONSULT TO GI  IP CONSULT TO GI     ADMITTING DIAGNOSIS:   Acute cholecystitis [K81.0]  Transaminitis [R74.01]     DISCHARGE DIAGNOSES:   Acute, likely superimposed on chronic, calculus cholecystitis with resultant diarrhea  Multiple electrolyte disturbance secondary to #1  Prior history of breast cancer status post mastectomy and systemic therapy    BRIEF HISTORY OF PRESENT ILLNESS:   Isabel Hager is a 65 y.o. female who presents for evaluation of abdominal pain diarrhea. Timing is constant, radiation to upper abdomen, alleviated by n.p.o. and started several weeks ago and severity is 7/10. Patient admits ongoing issues with abdominal pain and diarrhea. States that anything she eats or drinks causes diarrhea and horrible crampy abdominal pain. Most of her pain radiates to the right upper quadrant. Denies similar in the past.  No shortness of breath or chest pain. Does have a history of breast cancer and had a mastectomy on the left side previously in 2016. Denies prior knowledge of gallbladder stones. Denies previous hepatic insufficiency.   No alcohol use    LABS[de-identified]  Lab Results   Component Value Date    WBC 3.8 (L) 2023    HGB 12.1 2023    HCT 35.7 2023     2023     2023    K 4.4 2023     (H) 2023    CREATININE 0.7 2023    BUN 4 (L) 2023    CO2 22 2023    GLUCOSE 94 2023 ALT 31 01/17/2023    AST 36 (H) 01/17/2023     No results found for: INR, PROTIME   Lab Results   Component Value Date    TSH 1.960 01/16/2023     Lab Results   Component Value Date    TRIG 102 01/16/2023     Lab Results   Component Value Date    HDL 33 01/16/2023     Lab Results   Component Value Date    LDLCALC 88 01/16/2023     Lab Results   Component Value Date    LABA1C 5.5 01/16/2023       IMAGING:  NM HEPATOBILIARY    Result Date: 1/16/2023  EXAMINATION: NUCLEAR MEDICINE HEPATOBILIARY SCINTIGRAPHY (HIDA SCAN). 1/16/2023 7:46 am TECHNIQUE: Approximately 6.4 mCi Tc-99m Mebrofenin (Choletec) was administered IV. Then, dynamic images of the abdomen were obtained in the anterior projection for 60 min(s). COMPARISON: Ultrasound 01/14/2023 HISTORY: ORDERING SYSTEM PROVIDED HISTORY: eval cholecystitis TECHNOLOGIST PROVIDED HISTORY: Reason for exam:->eval cholecystitis What reading provider will be dictating this exam?->CRC FINDINGS: Prompt, homogenous uptake by the liver is noted with normal appearance of radiotracer excretion into the biliary system. Clearance of blood pool activity appears appropriate. Gallbladder and small bowel is visualized in appropriate sequence. The common bile duct and cystic duct are patent. No acute cholecystitis. CT ABDOMEN PELVIS W IV CONTRAST Additional Contrast? None    Result Date: 1/14/2023  EXAMINATION: CT OF THE ABDOMEN AND PELVIS WITH CONTRAST 1/14/2023 2:57 pm TECHNIQUE: CT of the abdomen and pelvis was performed with the administration of intravenous contrast. Multiplanar reformatted images are provided for review. Automated exposure control, iterative reconstruction, and/or weight based adjustment of the mA/kV was utilized to reduce the radiation dose to as low as reasonably achievable. COMPARISON: None.  HISTORY: ORDERING SYSTEM PROVIDED HISTORY: abd pain, diarrhea TECHNOLOGIST PROVIDED HISTORY: Additional Contrast?->None Reason for exam:->abd pain, diarrhea Decision Support Exception - unselect if not a suspected or confirmed emergency medical condition->Emergency Medical Condition (MA) FINDINGS: There are normal size and single phase IV contrast enhancement (arterial/portal venous phase) for the liver the spleen and pancreas. Gallbladder is normally distended. The gallstones are seen in the gallbladder. The largest gallstone measures 14 mm. The gallbladder is normally distended has wall thickness. There is no dilatation of the biliary tree or pancreatic ductal system. Adrenals not enlarged. Kidneys are preserved size and cortical thickness. There is no renal calculus obstructive uropathy. The small cyst-like is seen in the mid 3rd of the left kidney but too small to be characterized. The bladder is mild distended appears unremarkable. Patient had hysterectomy. The ovaries have unremarkable appearance. Left-sided fundal region the vagina there is an amorphous calcification which is of undetermined. This can be evaluated with a gynecological examination on routine basis. There are no acute inflammatory changes in the omental/mesentery fat planes, free intraperitoneal air, ascites or indication for bowel obstruction. There is no fluid contents in the colon to indicate a diarrhea like condition although semi solid fecal contents could be present. Please correlate clinically. Mild uncomplicated diverticulosis in the sigmoid colon. There is no signs for diverticulitis. The appendix seen and appear unremarkable. Aorta and IVC are of appearance. There is no indication for midline retroperitoneal adenopathy. There is a mild to moderate size hiatal hernia. Lower lung bases appear unremarkable. Visualized bone structures demonstrate no significant findings. Degenerative changes relate with spondylosis are seen in the visualized lower thoracic spine. 1.  No indication for acute intraperitoneal retroperitoneal process in the abdomen pelvis.  2.  No indication for metastatic disease in the abdomen or in the pelvis. US GALLBLADDER RUQ    Result Date: 1/14/2023  EXAMINATION: RIGHT UPPER QUADRANT ULTRASOUND 1/14/2023 3:34 pm COMPARISON: None. HISTORY: ORDERING SYSTEM PROVIDED HISTORY: Pain, transaminitis TECHNOLOGIST PROVIDED HISTORY: Reason for exam:->Pain, transaminitis What reading provider will be dictating this exam?->CRC FINDINGS: LIVER:  The liver demonstrates normal echogenicity without evidence of intrahepatic biliary ductal dilatation. Normal color flow identified in the main portal vein. Right hepatic lobe measured 17.0 cm. BILIARY SYSTEM:  There are shadowing stones identified within the gallbladder lumen. No wall thickening or pericholecystic fluid. Patient does report some tenderness when imaging over the gallbladder. Common bile duct is within normal limits measuring 5-6 mm. RIGHT KIDNEY: Corticomedullary differentiation and cortical thickness is maintained. No renal mass, renal cysts, nor intrarenal calcifications. There is no right hydronephrosis. Right renal length was 10.0 cm. PANCREAS:  Visualized portions of the pancreas are unremarkable. OTHER: No evidence of right upper quadrant ascites. 1.  Cholelithiasis. No gallbladder wall thickening or pericholecystic fluid. Patient does report tenderness when imaging over the gallbladder. 2.  No evidence of intrahepatic or extrahepatic biliary dilatation. HOSPITAL COURSE:   Curt Aase was evaluated by both the surgical and GI teams throughout the hospitalization. HIDA scan was negative and plans are for consideration of cholecystectomy as an outpatient once a formal trial of cholestyramine and Imodium have been utilized at home. She will also follow-up with the GI team for consideration once again of colonoscopic evaluation. Stool studies have been negative. C. difficile has been negative. Her diarrhea has slowed to some extent.   She understands importance of outpatient follow-up with the GI team..  Patient is medically stable and acceptable for discharge today. BRIEF PHYSICAL EXAMINATION AND LABORATORIES ON DAY OF DISCHARGE:  VITALS:  /81   Pulse 80   Temp 98.6 °F (37 °C) (Oral)   Resp 16   Ht 5' 6\" (1.676 m)   Wt 190 lb (86.2 kg)   SpO2 98%   BMI 30.67 kg/m²     HEENT:  PERRLA. EOMI. Sclera clear. Buccal mucosa moist.    Neck:  Supple. Trachea midline. No thyromegaly. No JVD. No bruits. Heart:  Rhythm regular, rate controlled. No murmurs. Lungs:  Symmetrical. Clear to auscultation bilaterally. No wheezes. No rhonchi. No rales. Abdomen: Soft. Non-tender. Non-distended. Bowel sounds positive. No organomegaly or masses. No pain on palpation    Extremities:  Peripheral pulses present. No peripheral edema. No ulcers. Neurologic:  Alert x 3. No focal deficit. Cranial nerves grossly intact. Skin:  No petechia. No hemorrhage. No wounds. DISPOSITION:  The patient's condition is stable. At this time the patient is without objective evidence of an acute process requiring continuing hospitalization or inpatient management. They are stable for discharge with outpatient follow-up. I have spoken with the patient and discussed the results of the current hospitalization, in addition to providing specific details for the plan of care and counseling regarding the diagnosis and prognosis. The plan has been discussed in detail and they are aware of the specific conditions for emergent return, as well as the importance of follow-up.   Their questions are answered at this time and they are agreeable with the plan for discharge to home    DISCHARGE MEDICATIONS:   Current Discharge Medication List             Details   ciprofloxacin (CIPRO) 500 MG tablet Take 1 tablet by mouth 2 times daily for 5 days  Qty: 10 tablet, Refills: 0      metroNIDAZOLE (FLAGYL) 500 MG tablet Take 1 tablet by mouth 3 times daily for 5 days  Qty: 15 tablet, Refills: 0      loperamide (IMODIUM) 2 MG capsule Take 1 capsule by mouth 4 times daily as needed for Diarrhea  Qty: 30 capsule, Refills: 0      cholestyramine (QUESTRAN) 4 g packet Take 1 packet by mouth 2 times daily  Qty: 90 packet, Refills: 3                Details   Lactobacillus (RA ACIDOPHILUS) 300 MG CAPS Take 300 mg by mouth daily  Qty: 30 capsule, Refills: 0                Details   pantoprazole (PROTONIX) 20 MG tablet Take 40 mg by mouth daily      sertraline (ZOLOFT) 50 MG tablet Take 50 mg by mouth daily      hydrochlorothiazide (HYDRODIURIL) 25 MG tablet Take 25 mg by mouth daily      loratadine (CLARITIN) 10 MG capsule Take 10 mg by mouth daily      Omega-3 Fatty Acids (FISH OIL) 1200 MG CAPS Take 1,200 mg by mouth daily              FOLLOW UP/INSTRUCTIONS:  This patient is instructed to follow-up with her primary care physician. Patient is instructed to follow-up with the consults listed above as directed by them. she is instructed to resume home medications and take new medications as indicated in the list above. If the patient has a recurrence of symptoms, she is instructed to go to the ED. Preparing for this patient's discharge, including paperwork, orders, instructions, and meeting with patient did require > 40 minutes.     Emanuel Guardado DO   1/17/2023  8:51 AM

## 2023-01-17 NOTE — CARE COORDINATION
1/17/2023 0940 CM note: Discharge order noted, plan is for pt to return home. Per surgeon progress note:, pt to follow up in 1-2 weeks to likely schedule outpatient lap dariel if not improved with immrohini and Linda Smiley.  Tenisha PERALTA

## 2023-01-17 NOTE — CONSULTS
[address]                                  CONSULTATION    PATIENT NAME: Davida Coates                     :        1960  MED REC NO:   [id number]                            ROOM:       [id number]  ACCOUNT NO:   [de-identified]                           ADMIT DATE: 2023  PROVIDER:     Nima Mendez    CONSULT DATE:  2023    PMD:  Caffie Osgood, MD    CONSULTING DOCTOR:  Dr. Bailey Sor:  Diarrhea. HISTORY OF PRESENT ILLNESS:  The patient is a 58-year-old female known  to have past medical history of left breast cancer, status post  mastectomy, chemoradiation therapy 6 years ago, history of loose stool,  on probiotic as an outpatient. Last colonoscopy with Dr. Ary Callejas,  screening 3 years ago was normal; told to follow up in 10 years. Admitted to the hospital with acute onset of diarrhea 10 days ago, up to  8-10 bowel movements per day with urgency. The patient denies any  abdominal pain. Denies any melena, hematochezia. Denies any fever or  chills. Denies any nausea or vomiting. She was found to have mild  elevation of liver enzyme on this admission with gallstones on  ultrasound of the abdomen, underwent a HIDA scan, was negative for any  acute cholecystitis. Diarrhea is still persistent since her admission 3  days ago. GI consult was requested for evaluation. The patient  underwent stool C. diff testing, it was negative. Stool cultures and  WBC are still pending on this admission. She has been placed on IV  antibiotic, Flagyl and Rocephin. Her white count is normal.  Imodium  has been started tonight for the first time. PAST MEDICAL HISTORY:  As above. PAST SURGICAL HISTORY:  Left breast lumpectomy. FAMILY HISTORY:  Noncontributory. MEDICATIONS:  At home, she was on Zoloft, Protonix, Claritin, vitamin D,  vitamin B12, acidophilus.     ALLERGIES:  No known medication allergies. SOCIAL HISTORY:  She denies any alcohol or drug abuse. She denies any  smoking history. She is . REVIEW OF SYSTEMS:  All systems reviewed unrevealing except per history. LABS. Sodium 134, potassium 3.4, was 2.8 yesterday, chloride 102, CO2  of 21, albumin 3.8, AST 27, ALT 32, alk phos 99, total protein 6.4,  bilirubin 0.3. WBC 5, H and H 12.6 and 37.6 and platelet count 969. Fecal leukocytes still pending. Stool for C. diff was negative. Stool  cultures are pending. PHYSICAL EXAMINATION:  VITAL SIGNS:  Blood pressure 127/75, pulse 81, respirations 16,  temperature 98.7. GENERAL:  Awake, oriented x3, in no acute distress. HEENT:  Normocephalic, atraumatic. Pupils equally reactive to light. Extraocular muscles intact. Conjunctivae injected. Sclerae icteric. NECK:  Supple. No palpable cervical lymphadenopathy, no palpable  thyromegaly. HEART:  S1, S2. No murmur. No gallop. LUNGS:  Clear to air bilateral.  No wheeze, no crackles. ABDOMEN:  Soft, benign. Positive bowel sounds. No rebound and no  tenderness. EXTREMITIES:  No edema. Positive pulse. SKIN:  No ecchymosis, no cyanosis and no clubbing. ASSESSMENT AND PLAN:  Acute onset of diarrhea in 45-year-old female,  known to have history of loose stool, on probiotic as outpatient with  normal colonoscopy 3 years ago and no history of colitis, most likely  her acute diarrhea with urgency due to infectious diarrhea, viral versus  bacterial infection is to be considered. Stool for C. diff has been  negative. The patient is still having 8-10 bowel movements a day. I  would recommend starting her on cholestyramine 4 g p.o. twice a day and  continuing Imodium that has been started tonight for the first time  every 6 hours as needed. The patient was advised to ask for Imodium if  diarrhea occurs, cholestyramine to be given 3 hours in between any other  oral medication.   Continue IV hydration and follow up electrolytes,  correct accordingly if needed. We will follow up on her stool cultures  and WBC. The patient's last colonoscopy 3 years ago was normal, besides  small size internal hemorrhoids. If diarrhea does not improve with  above measures, then we will consider colonoscopic evaluation. Thank you for consultation and allowing me taking care of your patient. Please feel free to call me if you have any questions.         Nikolay Aguilera    D: 01/16/2023 19:38:53       T: 01/16/2023 19:42:46     [email or web address]  Job#: [id number]     Doc#: [id number]    CC:

## 2023-01-18 LAB
CRYPTOSPORIDIUM ANTIGEN STOOL: NORMAL
CULTURE, STOOL: NORMAL
URINE CULTURE, ROUTINE: NORMAL

## 2023-01-20 LAB
BLOOD CULTURE, ROUTINE: NORMAL
CULTURE, BLOOD 2: NORMAL

## 2023-10-27 ENCOUNTER — APPOINTMENT (OUTPATIENT)
Dept: RADIOLOGY | Facility: CLINIC | Age: 63
End: 2023-10-27

## 2023-12-04 ENCOUNTER — ANCILLARY PROCEDURE (OUTPATIENT)
Dept: RADIOLOGY | Facility: CLINIC | Age: 63
End: 2023-12-04
Payer: COMMERCIAL

## 2023-12-04 DIAGNOSIS — C50.912 MALIGNANT NEOPLASM OF UNSPECIFIED SITE OF LEFT FEMALE BREAST (MULTI): ICD-10-CM

## 2023-12-04 DIAGNOSIS — Z08 ENCOUNTER FOR FOLLOW-UP EXAMINATION AFTER COMPLETED TREATMENT FOR MALIGNANT NEOPLASM: ICD-10-CM

## 2023-12-04 PROCEDURE — 77067 SCR MAMMO BI INCL CAD: CPT | Mod: RIGHT SIDE | Performed by: RADIOLOGY

## 2023-12-04 PROCEDURE — 77063 BREAST TOMOSYNTHESIS BI: CPT | Mod: RIGHT SIDE | Performed by: RADIOLOGY

## 2023-12-04 PROCEDURE — 77067 SCR MAMMO BI INCL CAD: CPT | Mod: RT

## 2023-12-07 ENCOUNTER — TELEPHONE (OUTPATIENT)
Dept: HEMATOLOGY/ONCOLOGY | Facility: HOSPITAL | Age: 63
End: 2023-12-07
Payer: COMMERCIAL

## 2023-12-07 NOTE — TELEPHONE ENCOUNTER
Call to pt to review normal mammogram- message left for pt, instructed to call dept with questions

## 2024-02-28 PROBLEM — D64.9 ANEMIA: Status: ACTIVE | Noted: 2024-02-28

## 2024-02-28 PROBLEM — E55.9 VITAMIN D INSUFFICIENCY: Status: ACTIVE | Noted: 2024-02-28

## 2024-02-28 PROBLEM — F41.9 ANXIETY DISORDER: Status: ACTIVE | Noted: 2024-02-28

## 2024-02-28 PROBLEM — J30.9 ALLERGIC RHINITIS: Status: ACTIVE | Noted: 2024-02-28

## 2024-02-28 PROBLEM — R91.1 NODULE OF RIGHT LUNG: Status: ACTIVE | Noted: 2024-02-28

## 2024-02-28 PROBLEM — R00.2 PALPITATIONS: Status: ACTIVE | Noted: 2024-02-28

## 2024-02-28 PROBLEM — R06.02 SHORTNESS OF BREATH: Status: ACTIVE | Noted: 2024-02-28

## 2024-02-28 PROBLEM — R50.9 FEVER OF UNKNOWN ORIGIN: Status: ACTIVE | Noted: 2017-01-05

## 2024-02-28 PROBLEM — C50.912 BREAST CANCER, LEFT (MULTI): Status: ACTIVE | Noted: 2024-02-28

## 2024-02-28 PROBLEM — I89.0 LYMPHEDEMA OF ARM: Status: ACTIVE | Noted: 2024-02-28

## 2024-02-28 PROBLEM — K81.0 ACUTE CHOLECYSTITIS: Status: ACTIVE | Noted: 2023-01-14

## 2024-02-28 PROBLEM — M89.9 BONE DISORDER: Status: ACTIVE | Noted: 2024-02-28

## 2024-02-28 PROBLEM — M79.89 SWELLING OF LEFT LOWER EXTREMITY: Status: ACTIVE | Noted: 2024-02-28

## 2024-02-28 PROBLEM — Z85.3 PERSONAL HISTORY OF BREAST CANCER: Status: ACTIVE | Noted: 2024-02-28

## 2024-02-28 PROBLEM — R07.9 CHEST PAIN: Status: ACTIVE | Noted: 2024-02-28

## 2024-02-28 RX ORDER — CHOLESTYRAMINE 4 G/9G
4 POWDER, FOR SUSPENSION ORAL
COMMUNITY
Start: 2023-01-17

## 2024-02-28 RX ORDER — PANTOPRAZOLE SODIUM 40 MG/1
40 TABLET, DELAYED RELEASE ORAL DAILY
COMMUNITY
Start: 2023-11-19

## 2024-02-28 RX ORDER — FERROUS SULFATE 325(65) MG
TABLET ORAL
COMMUNITY

## 2024-02-28 RX ORDER — HYDROCHLOROTHIAZIDE 25 MG/1
25 TABLET ORAL DAILY
COMMUNITY

## 2024-02-28 RX ORDER — CHOLECALCIFEROL (VITAMIN D3) 25 MCG
1 TABLET ORAL DAILY
COMMUNITY
Start: 2016-10-20

## 2024-02-28 RX ORDER — SERTRALINE HYDROCHLORIDE 50 MG/1
50 TABLET, FILM COATED ORAL DAILY
COMMUNITY

## 2024-02-28 RX ORDER — MULTIVITAMIN
1 TABLET ORAL DAILY
COMMUNITY

## 2024-04-23 PROBLEM — Z85.3 ENCOUNTER FOR FOLLOW-UP SURVEILLANCE OF BREAST CANCER: Status: ACTIVE | Noted: 2024-04-23

## 2024-04-23 PROBLEM — Z08 ENCOUNTER FOR FOLLOW-UP SURVEILLANCE OF BREAST CANCER: Status: ACTIVE | Noted: 2024-04-23

## 2024-04-23 NOTE — PROGRESS NOTES
Patient ID: Christine Malik is a 64 y.o. female.  BREAST CANCER DIAGNOSIS:   Breast         AJCC Edition: 7th (AJCC), Diagnosis Date: 20-Oct-2016, IIA, T2 N0 M0      Treatment Synopsis:    History of left-sided invasive ductal carcinoma, stage  IIA (T2 N0 M0). The patient's breast cancer was diagnosed on October 20, 2016, and is estrogen receptor positive at 95%, progesterone receptor positive at 90%, and HER-2/renae 2+ by IHC and not amplified  by FISH at 1.3.      In addition patient has an oncotype DX recurrence score of 31 translating to a 10-year distant recurrence rate of 21%.      Patient has completed a left mastectomy without reconstruction with SLNB on 11/11/2016. Pathology showed a 2.2cm mass, grade 3, with 0/7 LNs involved.     10/31/2016 genetic testing negative     4/2017 Initiated on Anastrazole     10/2018 changed to letrozole for intolerable side effects with anastrazole.     4/2022 Completed 5 year course antiestrogen therapy               Treatment History:       2016-11-11: Cancer Related Surgery:      Completed left mastectomy  2016-12-28: Chemotherapy:        Received cycle # of Taxotere and Cyclophosphamide  2017-3-1: Chemotherapy:            Completed 4 cycles of TC from 12/18/16 through to 03/01/2017 2017-4-12: New Treatment Plan:              Started arimidex  2018-10-26: New Treatment Plan:            Letrozole 2.5mg po daily started. Arimidex discontinued due to arthralgia. Tolerating Letrozole well.     Past Medical History: Christine has a past medical history of Anxiety disorder, unspecified (11/18/2015), Encounter for other preprocedural examination (10/28/2016), Encounter for other screening for malignant neoplasm of breast (10/03/2016), Other abnormal and inconclusive findings on diagnostic imaging of breast (10/20/2016), Pelvic and perineal pain (10/03/2016), Personal history of malignant neoplasm of breast (11/08/2016), Personal history of other diseases of the circulatory system  "(2015), and Unspecified lump in the left breast, unspecified quadrant (10/20/2016).  Surgical History:  Christine has a past surgical history that includes Dilation and curettage of uterus (2014); Hysterectomy (2014);  section, classic (2014); Other surgical history (2014); Other surgical history (2016); [place] lymph node biopsy (2016); and Mastectomy (Left).  Social History:    Social Substance History:  ·  Smoking Status never smoker   ·  Tobacco Use denies   ·  Alcohol Use occasionally, 1 drink per month   ·  Drug Use denies   ·  Additional History     She is , lives in a house house with family, completed 10th grade, currently employed as a      Breast Cancer Risk Factors: , Had her menarche at age 14 years, 1st pregnancy at age 27 years, menopause in  , never used BCP or HRT     Family History & Family Oncology History:  Mother had breast cancer at age 38 years,  of old age at age 97 years, father had metastatic prostate cancer at age 63 years, ; Sister had uterine cancer, and brother had prostate cancer, both are alive       HISTORY OF PRESENT ILLNESS:  Christine Malik is a 64 y.o. female who is here for Breast cancer treatment follow-up and surveillance. She is doing well today. She is observational therapy since completion of a 5 year course of AI therapy in 2022. She reports about once every couple months she will have \"intense itching\" in the left upper mastectomy bed. She reports when it happened last one month ago the itching lasted 2 days, typically it will only last for a few hours. She denies any pain associated with the itching. She has tried oral benadryl and hydrocortisone anti itch cream and a lotion. She reports she baseline takes a daily Claritin. She denies feeling any masses or tissue changes.  She denies any new increase in fatigue. She denies any new skin changes or masses or skin infections " elsewhere.     She reports normal appetite, bowels and urination. She is working with diet changes for her cholesterol.       She denies any chest pain or breathing issues, no cough, no sob.      She denies any vision changes or headache issues, dizziness or loss of balance, no falls. She reports rare and minimal neuropathy in her feet.     She denies any new or unexplained bone aches or pains, continued baseline age related body aches, has a lumbar bulging disc      She denies any issues with sleep or fatigue. She continues active with water aerobics 3-4 days per week. She takes daily calcium with Vit d.    Review of Systems - Oncology  ROS 14 points performed, See HPI for exceptions    OBJECTIVE:    VS / Pain:  /89   Pulse 79   Temp 35.9 °C (96.6 °F)   Resp 18   Wt 86.1 kg (189 lb 12.8 oz)   SpO2 97%   BMI 30.63 kg/m²   BSA: 2 meters squared   Pain Scale: 0  ECO- Fully active, able to carry on all pre-disease performance w/o restriction.      Performance Status:       Physical Exam  Constitutional: Well developed, awake/alert/oriented x4, no distress, alert and cooperative  EYES: Sclera clear  ENMT: mucous membranes moist, no apparent injury, no lesions seen  Head/Neck: Neck supple, no apparent injury, thyroid without mass or tenderness, No JVD, trachea midline, no bruits  Respiratory / Thoracic: Patent airways, clear to all lobes, normal breath sounds with good chest expansion, thorax symmetric.  Cardiovascular: Regular, rate and rhythm, no murmurs, 2+ equal pulses of the extremities, normal auscultated S 1and S 2  GI: Nondistended, soft, non-tender, no rebound tenderness or guarding, no masses palpable, no organomegaly, +BS, no bruits  Musculoskeletal: ROM intact, no joint swelling, normal strength, no spinal tenderness  Extremities: normal extremities, no cyanosis edema, contusions or wounds, no clubbing  Neurological: alert and oriented x4, intact senses, motor, response and reflexes, normal  strength  Breast: S/p Left mastectomy without reconstruction. No palpable masses or lesions in right breast or left chest wall however medial skin thickening near the sternum in the left chest wall. Baseline hypopigmentation across the left chest wall.   Lymphatic: No cervical, supraclavicular, infraclavicular or axillary lymphadenopathy with exception to a palpable left anterior inferior cervical LN that is mobile and non tender  Psychological: Appropriate and talkative mood and behavior  Skin: Warm and dry, no lesions, no rashes, no jaundice          Diagnostic Results   BI mammo right screening tomosynthesis 12/04/2023    Addendum 12/7/2023 11:03 AM  Interpreted By:  Zoe Castillo,  ADDENDUM:  This is a correction to the BI-RADS category laterality.    BI-RADS Category:  1 Negative.  Recommendation:  Routine Screening Mammogram in 1 Year.  Recommended Date:  1 Year.  Laterality:  Right.    Signed by: Zoe Castillo 12/7/2023 11:03 AM    -------- ORIGINAL REPORT --------  Dictation workstation:   [id number]    Narrative  Interpreted By:  Zoe Castillo,  STUDY:  BI MAMMO RIGHT SCREENING TOMOSYNTHESIS;  12/4/2023 4:01 pm    ACCESSION NUMBER(S):  [id number]    ORDERING CLINICIAN:  WENDY ORDONEZ    INDICATION:  Screening. Left mastectomy.    COMPARISON:  10/25/2022, 10/19/2021, 10/19/2020    FINDINGS:  2D and tomosynthesis images were reviewed at 1 mm slice thickness.    Density:  There are areas of scattered fibroglandular tissue.    No suspicious masses or calcifications are identified.    Impression  No mammographic evidence of malignancy.    BI-RADS CATEGORY:    BI-RADS Category:  1 Negative.  Recommendation:  Routine Screening Mammogram in 1 Year.  Recommended Date:  1 Year.  Laterality:  Bilateral.    For any future breast imaging appointments, please call [phone]  ([phone]).      MACRO:  None    Signed by: Zoe Castillo 12/7/2023 10:59 AM  Dictation workstation:   [id number]        === 09/02/22 ===    BONE DENSITY    - Impression -  The BMD measured at [facility] Spine L1-L4 is 1.058 g/cm2 with a T-score of  -1.0.  Bone density is up to 10% below young normal.  This patient is  considered normal according to World Health Organization (WHO)  criteria.    With a Z-score of -0.5, this patient's BMD is considered within  normal limits relative to their age.  Even so, they may be considered  osteopenic or osteoporotic, which is normal for this age.  .  Bone mineral density in the lumbar spine may be falsely elevated  secondary to discogenic degenerative disease and degenerative facet  sclerosis.    Follow-up DEXA and treatment is recommended as clinically indicated.  .  All images and detailed analysis are available on the  Radiology  PACS.*    *For patients with comparison exams obtained from Hologic DEXA prior  to December 2006, measurements presented in this report have been  modified to reflect more accurate trend analysis when compared to  current data obtained on the [facility] DEXA.     Assessment/Plan   Breast cancer, left (Multi), Clinical: Stage IIA (cT2, cN0, cM0, G3, ER+, CA+, HER2-)  Diagnoses and all orders for this visit:  Malignant neoplasm of left breast in female, estrogen receptor positive, unspecified site of breast (Multi) (Primary)  Encounter for follow-up surveillance of breast cancer    Problem List Items Addressed This Visit       Breast cancer, left (Multi) - Primary    Encounter for follow-up surveillance of breast cancer     IIA, T2 N0 M0 GRADE 3  October 2016 LEFT invasive ductal carcinoma that was HR+/HER2-. Oncotx  31. 11/2016 Mast SLNB 0/7. Adj Chemo TC completed 3/1/2017. Genetics Neg 10/2016. Anastrazole 4/2017/ 10/2018 change to letrozole. Five year course completed April 2022.     New reported left chest wall tissue itching- left cervical LN and medial tissue thickening in left chest wall. Stat labs and CT chest ordered today to evaluate. She understands I will call  with results when available.      Bone health    Repeat DEXA 9/2022 T score -1.0. Will advise future testing with PCP, I have encouraged her to repeat testing late 2024     General Health Maintenance  PCP annually and as needed, local to her     At least 25 minutes of direct consultation was spent with the patient today reviewing her cancer care plan, educating and answering questions regarding ongoing follow up, greater than 50% in counseling and coordination of care.     Treatment Plan:  [No matching plan found]          Thank you for the opportunity to be involved in the care of Christine Malik.   We discussed the clinical significance of diagnosis, goals of care and treatment plan in detail.   Please do not hesitate to reach out with any questions. Thank you.     -------------------------------------------------------------------------------------------------------------------------------  Alize Hernandez MSN, APRN, FNP-C  [facility]  Division of Medical Oncology- Breast   Collaborating Physician Dr. Keshav Camarena   [facility] Nurse Partners [facility]anna [place] [address]  Phone: [phone]  Fax: [phone]  Available via Secure Chat    Confidential Peer Review Document-  Privilege  Privileged Pursuant to [place] Revised Code Section 2305.24, .25, .251 & .252

## 2024-04-24 ENCOUNTER — LAB (OUTPATIENT)
Dept: LAB | Facility: LAB | Age: 64
End: 2024-04-24
Payer: COMMERCIAL

## 2024-04-24 ENCOUNTER — OFFICE VISIT (OUTPATIENT)
Dept: HEMATOLOGY/ONCOLOGY | Facility: CLINIC | Age: 64
End: 2024-04-24
Payer: COMMERCIAL

## 2024-04-24 VITALS
BODY MASS INDEX: 30.63 KG/M2 | RESPIRATION RATE: 18 BRPM | TEMPERATURE: 96.6 F | WEIGHT: 189.8 LBS | HEART RATE: 79 BPM | DIASTOLIC BLOOD PRESSURE: 89 MMHG | SYSTOLIC BLOOD PRESSURE: 143 MMHG | OXYGEN SATURATION: 97 %

## 2024-04-24 DIAGNOSIS — L29.9 ITCH OF SKIN: ICD-10-CM

## 2024-04-24 DIAGNOSIS — Z85.3 ENCOUNTER FOR FOLLOW-UP SURVEILLANCE OF BREAST CANCER: ICD-10-CM

## 2024-04-24 DIAGNOSIS — R59.9 PALPABLE LYMPH NODE: ICD-10-CM

## 2024-04-24 DIAGNOSIS — Z17.0 MALIGNANT NEOPLASM OF LEFT BREAST IN FEMALE, ESTROGEN RECEPTOR POSITIVE, UNSPECIFIED SITE OF BREAST (MULTI): Primary | ICD-10-CM

## 2024-04-24 DIAGNOSIS — Z08 ENCOUNTER FOR FOLLOW-UP SURVEILLANCE OF BREAST CANCER: ICD-10-CM

## 2024-04-24 DIAGNOSIS — C50.912 MALIGNANT NEOPLASM OF LEFT BREAST IN FEMALE, ESTROGEN RECEPTOR POSITIVE, UNSPECIFIED SITE OF BREAST (MULTI): Primary | ICD-10-CM

## 2024-04-24 DIAGNOSIS — Z90.12 HISTORY OF LEFT MASTECTOMY: ICD-10-CM

## 2024-04-24 DIAGNOSIS — Z17.0 MALIGNANT NEOPLASM OF LEFT BREAST IN FEMALE, ESTROGEN RECEPTOR POSITIVE, UNSPECIFIED SITE OF BREAST (MULTI): ICD-10-CM

## 2024-04-24 DIAGNOSIS — C50.912 MALIGNANT NEOPLASM OF LEFT BREAST IN FEMALE, ESTROGEN RECEPTOR POSITIVE, UNSPECIFIED SITE OF BREAST (MULTI): ICD-10-CM

## 2024-04-24 LAB
ALBUMIN SERPL BCP-MCNC: 4.3 G/DL (ref 3.4–5)
ALP SERPL-CCNC: 117 U/L (ref 33–136)
ALT SERPL W P-5'-P-CCNC: 15 U/L (ref 7–45)
ANION GAP SERPL CALC-SCNC: 12 MMOL/L (ref 10–20)
AST SERPL W P-5'-P-CCNC: 18 U/L (ref 9–39)
BASOPHILS # BLD AUTO: 0.01 X10*3/UL (ref 0–0.1)
BASOPHILS NFR BLD AUTO: 0.2 %
BILIRUB SERPL-MCNC: 0.4 MG/DL (ref 0–1.2)
BUN SERPL-MCNC: 23 MG/DL (ref 6–23)
CALCIUM SERPL-MCNC: 9.3 MG/DL (ref 8.6–10.3)
CHLORIDE SERPL-SCNC: 101 MMOL/L (ref 98–107)
CO2 SERPL-SCNC: 29 MMOL/L (ref 21–32)
CREAT SERPL-MCNC: 0.91 MG/DL (ref 0.5–1.05)
EGFRCR SERPLBLD CKD-EPI 2021: 71 ML/MIN/1.73M*2
EOSINOPHIL # BLD AUTO: 0.04 X10*3/UL (ref 0–0.7)
EOSINOPHIL NFR BLD AUTO: 0.9 %
ERYTHROCYTE [DISTWIDTH] IN BLOOD BY AUTOMATED COUNT: 13.7 % (ref 11.5–14.5)
GLUCOSE SERPL-MCNC: 104 MG/DL (ref 74–99)
HCT VFR BLD AUTO: 38.1 % (ref 36–46)
HGB BLD-MCNC: 12.2 G/DL (ref 12–16)
IMM GRANULOCYTES # BLD AUTO: 0.02 X10*3/UL (ref 0–0.7)
IMM GRANULOCYTES NFR BLD AUTO: 0.5 % (ref 0–0.9)
LYMPHOCYTES # BLD AUTO: 1.12 X10*3/UL (ref 1.2–4.8)
LYMPHOCYTES NFR BLD AUTO: 26.3 %
MCH RBC QN AUTO: 28.4 PG (ref 26–34)
MCHC RBC AUTO-ENTMCNC: 32 G/DL (ref 32–36)
MCV RBC AUTO: 89 FL (ref 80–100)
MONOCYTES # BLD AUTO: 0.51 X10*3/UL (ref 0.1–1)
MONOCYTES NFR BLD AUTO: 12 %
NEUTROPHILS # BLD AUTO: 2.56 X10*3/UL (ref 1.2–7.7)
NEUTROPHILS NFR BLD AUTO: 60.1 %
NRBC BLD-RTO: 0 /100 WBCS (ref 0–0)
PLATELET # BLD AUTO: 283 X10*3/UL (ref 150–450)
POTASSIUM SERPL-SCNC: 4.5 MMOL/L (ref 3.5–5.3)
PROT SERPL-MCNC: 7.2 G/DL (ref 6.4–8.2)
RBC # BLD AUTO: 4.3 X10*6/UL (ref 4–5.2)
SODIUM SERPL-SCNC: 137 MMOL/L (ref 136–145)
WBC # BLD AUTO: 4.3 X10*3/UL (ref 4.4–11.3)

## 2024-04-24 PROCEDURE — 1036F TOBACCO NON-USER: CPT | Performed by: NURSE PRACTITIONER

## 2024-04-24 PROCEDURE — 85025 COMPLETE CBC W/AUTO DIFF WBC: CPT

## 2024-04-24 PROCEDURE — 99215 OFFICE O/P EST HI 40 MIN: CPT | Performed by: NURSE PRACTITIONER

## 2024-04-24 PROCEDURE — 80053 COMPREHEN METABOLIC PANEL: CPT

## 2024-04-24 PROCEDURE — 36415 COLL VENOUS BLD VENIPUNCTURE: CPT

## 2024-04-24 ASSESSMENT — PAIN SCALES - GENERAL: PAINLEVEL: 0-NO PAIN

## 2024-04-24 NOTE — PATIENT INSTRUCTIONS
Alize Hernandez APRCATHERINE, CNP follow-up 12  months.    For new concerns of left mastectomy chest wall itching and palpable left neck lymph node I would like you to complete a CT scan of chest first available and blood work today. I will call you once resulted     Annual Mammogram AFTER 12/4/2025 I will call with results.       GYN Dr. Pittman follow up annually      PCP Dr. Mares annually and as needed, routine blood work.      Call with any questions, concerns or treatment intolerance prior to next office visit [phone].

## 2024-05-10 ENCOUNTER — HOSPITAL ENCOUNTER (OUTPATIENT)
Dept: RADIOLOGY | Facility: HOSPITAL | Age: 64
Discharge: HOME | End: 2024-05-10
Payer: COMMERCIAL

## 2024-05-10 ENCOUNTER — TELEPHONE (OUTPATIENT)
Dept: HEMATOLOGY/ONCOLOGY | Facility: HOSPITAL | Age: 64
End: 2024-05-10
Payer: COMMERCIAL

## 2024-05-10 DIAGNOSIS — Z85.3 PERSONAL HISTORY OF BREAST CANCER: Primary | ICD-10-CM

## 2024-05-10 DIAGNOSIS — Z90.12 HISTORY OF LEFT MASTECTOMY: ICD-10-CM

## 2024-05-10 DIAGNOSIS — C50.912 MALIGNANT NEOPLASM OF LEFT BREAST IN FEMALE, ESTROGEN RECEPTOR POSITIVE, UNSPECIFIED SITE OF BREAST (MULTI): ICD-10-CM

## 2024-05-10 DIAGNOSIS — R59.9 PALPABLE LYMPH NODE: ICD-10-CM

## 2024-05-10 DIAGNOSIS — L29.9 ITCH OF SKIN: ICD-10-CM

## 2024-05-10 DIAGNOSIS — Z08 ENCOUNTER FOR FOLLOW-UP SURVEILLANCE OF BREAST CANCER: ICD-10-CM

## 2024-05-10 DIAGNOSIS — Z85.3 ENCOUNTER FOR FOLLOW-UP SURVEILLANCE OF BREAST CANCER: ICD-10-CM

## 2024-05-10 DIAGNOSIS — Z17.0 MALIGNANT NEOPLASM OF LEFT BREAST IN FEMALE, ESTROGEN RECEPTOR POSITIVE, UNSPECIFIED SITE OF BREAST (MULTI): ICD-10-CM

## 2024-05-10 PROCEDURE — 71260 CT THORAX DX C+: CPT

## 2024-05-10 PROCEDURE — 71260 CT THORAX DX C+: CPT | Performed by: RADIOLOGY

## 2024-05-10 PROCEDURE — 2550000001 HC RX 255 CONTRASTS: Performed by: NURSE PRACTITIONER

## 2024-05-10 RX ORDER — ALPRAZOLAM 0.25 MG/1
0.25 TABLET ORAL AS NEEDED
Qty: 6 TABLET | Refills: 0 | Status: SHIPPED | OUTPATIENT
Start: 2024-05-10

## 2024-05-10 RX ADMIN — IOHEXOL 50 ML: 350 INJECTION, SOLUTION INTRAVENOUS at 15:26

## 2024-05-10 NOTE — TELEPHONE ENCOUNTER
Call to patient to review results from CT of chest.  We reviewed the consent to undergo additional workup with radiology at [facility] on May 13 first thing in the morning with possible biopsy of area in the left chest wall and left axilla.  We have discussed additional test will be ordered for CT of the abdomen pelvis and bone scan to rule out any distant metastatic disease given concerning findings on CT chest completed today.  Orders placed additionally for procedural anxiety for Xanax.  Questions have been answered patient has my direct contact number should she have any additional questions between now and May 13.  Case reviewed with Dr. Sherie Olson and Dr. Maine Boone

## 2024-05-11 ENCOUNTER — TELEPHONE (OUTPATIENT)
Dept: ADMISSION | Facility: HOSPITAL | Age: 64
End: 2024-05-11
Payer: COMMERCIAL

## 2024-05-11 NOTE — TELEPHONE ENCOUNTER
Per Alize, she is okay to take the Xanax PRN before her procedure- if she was taking it regularly along with Prozac then could pose a problem but okay to take in this situation. Pt aware.

## 2024-05-11 NOTE — TELEPHONE ENCOUNTER
I let the pt know- she said she is already on Prozac 50mg once daily in the AM. She wants to know if it is still okay to take the Xanax before her procedure on Monday. I will reach out to the on call fellow and let her know.

## 2024-05-11 NOTE — TELEPHONE ENCOUNTER
Pt called & said [facility] had questions regarding her Xanax script so I called them. The pharmacist said he can't bill insurance for just PRN it has to have a frequency. So he will put it down for 0.25mg 1 tab BID PRN to be taken before procedures. They will still give her 6 tabs. I will let the pt know.

## 2024-05-13 ENCOUNTER — HOSPITAL ENCOUNTER (OUTPATIENT)
Dept: RADIOLOGY | Facility: CLINIC | Age: 64
Discharge: HOME | End: 2024-05-13
Payer: COMMERCIAL

## 2024-05-13 ENCOUNTER — TELEPHONE (OUTPATIENT)
Dept: HEMATOLOGY/ONCOLOGY | Facility: HOSPITAL | Age: 64
End: 2024-05-13
Payer: COMMERCIAL

## 2024-05-13 DIAGNOSIS — Z85.3 PERSONAL HISTORY OF BREAST CANCER: ICD-10-CM

## 2024-05-13 DIAGNOSIS — R22.2 MASS OF CHEST WALL, LEFT: Primary | ICD-10-CM

## 2024-05-13 DIAGNOSIS — R92.8 OTHER ABNORMAL AND INCONCLUSIVE FINDINGS ON DIAGNOSTIC IMAGING OF BREAST: ICD-10-CM

## 2024-05-13 PROCEDURE — 2720000007 HC OR 272 NO HCPCS

## 2024-05-13 PROCEDURE — 88381 MICRODISSECTION MANUAL: CPT | Performed by: PATHOLOGY

## 2024-05-13 PROCEDURE — G0452 MOLECULAR PATHOLOGY INTERPR: HCPCS | Performed by: NURSE PRACTITIONER

## 2024-05-13 PROCEDURE — 88360 TUMOR IMMUNOHISTOCHEM/MANUAL: CPT | Mod: TC,59,AHULAB | Performed by: NURSE PRACTITIONER

## 2024-05-13 PROCEDURE — 77065 DX MAMMO INCL CAD UNI: CPT | Mod: LEFT SIDE | Performed by: STUDENT IN AN ORGANIZED HEALTH CARE EDUCATION/TRAINING PROGRAM

## 2024-05-13 PROCEDURE — 76642 ULTRASOUND BREAST LIMITED: CPT | Mod: LT

## 2024-05-13 PROCEDURE — A4648 IMPLANTABLE TISSUE MARKER: HCPCS

## 2024-05-13 PROCEDURE — 88305 TISSUE EXAM BY PATHOLOGIST: CPT | Performed by: PATHOLOGY

## 2024-05-13 PROCEDURE — 76982 USE 1ST TARGET LESION: CPT | Mod: LT

## 2024-05-13 PROCEDURE — 88360 TUMOR IMMUNOHISTOCHEM/MANUAL: CPT | Performed by: PATHOLOGY

## 2024-05-13 PROCEDURE — 2500000005 HC RX 250 GENERAL PHARMACY W/O HCPCS: Performed by: STUDENT IN AN ORGANIZED HEALTH CARE EDUCATION/TRAINING PROGRAM

## 2024-05-13 PROCEDURE — 81458 SO GSAP DNA CPY NMBR&MCRSTL: CPT | Performed by: NURSE PRACTITIONER

## 2024-05-13 PROCEDURE — C1819 TISSUE LOCALIZATION-EXCISION: HCPCS

## 2024-05-13 PROCEDURE — 19083 BX BREAST 1ST LESION US IMAG: CPT | Mod: LT

## 2024-05-13 PROCEDURE — 19083 BX BREAST 1ST LESION US IMAG: CPT | Mod: LEFT SIDE | Performed by: STUDENT IN AN ORGANIZED HEALTH CARE EDUCATION/TRAINING PROGRAM

## 2024-05-13 PROCEDURE — 88368 INSITU HYBRIDIZATION MANUAL: CPT | Performed by: PATHOLOGY

## 2024-05-13 RX ADMIN — Medication 10 ML: at 10:30

## 2024-05-13 ASSESSMENT — PAIN SCALES - GENERAL
PAINLEVEL_OUTOF10: 0 - NO PAIN

## 2024-05-13 ASSESSMENT — PAIN - FUNCTIONAL ASSESSMENT
PAIN_FUNCTIONAL_ASSESSMENT: 0-10
PAIN_FUNCTIONAL_ASSESSMENT: 0-10

## 2024-05-13 NOTE — DISCHARGE INSTRUCTIONS
AFTER THE TEST  A steri-strip and bandage will be placed over the incision. You may shower after 24 hours. Remove bandage after 24 hours. Remove bandage after the shower. Leave the steri-strips in place to fall off on their own. If after 1 week the steri-strips are still on, you may remove them. Avoid swimming or soaking in tub for 3 days.     You may have mild discomfort at the test site. If needed, you may take Tylenol (Acetaminophen) for pain. Please avoid taking NSAIDs, Motrin, Advil, Aleve, or ibuprofen for 24 hours following the biopsy. After 24 hours you may resume NSAIDSs.     If you take aspirin, Plavix, Coumadin, Xarelto or Eliquis please tell us. If these medications were stopped by your provider, please ask them when to resume.     You may have some tenderness, bruising or slight bleeding at the site. Please apply ice packs to the site for 15 minutes on and 15 minutes off for a 2 hour minimum.     Most people can return to their usual routine after the procedure. Avoid Strenuous activity for 24 hours.     Sleep in a bra the night after your biopsy. Continue to do so for comfort. - Not applicable. Patient biopsy on chest wall.     Call your provider if you have any of the following symptoms :  Fever  Increased pain  Increased bleeding  Redness  Increased swelling  Yellowish drainage  Your provider will get the biopsy results within 5 - 7 days. Call your provider with any questions.     Patient education brochure and pain/comfort measures have been reviewed.   Phone number provided to contact [facility] if problems arise.     Patient verbalized understanding of home going instructions.

## 2024-05-13 NOTE — Clinical Note
Procedural steps explained and patient given opportunity to verbalize concerns and seek clarification.  Post procedure self-care and potential for bruising , hematoma, and pain reviewed.  Patient verbalizes understanding.     Patient offered aromatherapy, warm blankets and music. Guided imagery, touch and relaxation breathing to be used throughout the procedure.

## 2024-05-13 NOTE — TELEPHONE ENCOUNTER
Call to patient to check in - she report biopsy went well. She reports scans are not yet scheduled. I have discussed with her I will reach out to my schedulers at [facility] to help with STAT NM Bone scan and CT Abdomen / Pelvis

## 2024-05-16 ENCOUNTER — TELEPHONE (OUTPATIENT)
Dept: HEMATOLOGY/ONCOLOGY | Facility: HOSPITAL | Age: 64
End: 2024-05-16
Payer: COMMERCIAL

## 2024-05-16 DIAGNOSIS — Z17.0 MALIGNANT NEOPLASM OF LEFT BREAST IN FEMALE, ESTROGEN RECEPTOR POSITIVE, UNSPECIFIED SITE OF BREAST (MULTI): ICD-10-CM

## 2024-05-16 DIAGNOSIS — C50.912 MALIGNANT NEOPLASM OF LEFT BREAST IN FEMALE, ESTROGEN RECEPTOR POSITIVE, UNSPECIFIED SITE OF BREAST (MULTI): ICD-10-CM

## 2024-05-16 DIAGNOSIS — Z17.0 MALIGNANT NEOPLASM OF LEFT BREAST IN FEMALE, ESTROGEN RECEPTOR POSITIVE, UNSPECIFIED SITE OF BREAST (MULTI): Primary | ICD-10-CM

## 2024-05-16 DIAGNOSIS — C50.912 MALIGNANT NEOPLASM OF LEFT BREAST IN FEMALE, ESTROGEN RECEPTOR POSITIVE, UNSPECIFIED SITE OF BREAST (MULTI): Primary | ICD-10-CM

## 2024-05-16 LAB — TEST COMMENT - SURGICAL SENDOUT REQUEST: NORMAL

## 2024-05-16 NOTE — TELEPHONE ENCOUNTER
Call to patient to review positive pathology from chest wall biopsy May 13 showing hormone positive grade 3 invasive ductal carcinoma.  We reviewed I want her to continue with workup scans scheduled for tomorrow May 17.  She is requesting to continue with myself and my collaborating physician Dr. Keshav Camarena.  I have discussed with her we will get a placeholder and for her to meet with Dr. Camarena on May 22 given concerning CAT scan suggestive of stage IV disease in lungs and bones.  She is agreeable to this plan and understands somebody from my team or myself will be in touch with appointments for next week.  She understands we will be in touch with results of scans when they are available as well.  Questions have been answered

## 2024-05-17 ENCOUNTER — HOSPITAL ENCOUNTER (OUTPATIENT)
Dept: RADIOLOGY | Facility: CLINIC | Age: 64
Discharge: HOME | End: 2024-05-17
Payer: COMMERCIAL

## 2024-05-17 DIAGNOSIS — Z85.3 PERSONAL HISTORY OF BREAST CANCER: ICD-10-CM

## 2024-05-17 PROCEDURE — A9503 TC99M MEDRONATE: HCPCS | Performed by: NURSE PRACTITIONER

## 2024-05-17 PROCEDURE — 3430000001 HC RX 343 DIAGNOSTIC RADIOPHARMACEUTICALS: Performed by: NURSE PRACTITIONER

## 2024-05-17 PROCEDURE — 78306 BONE IMAGING WHOLE BODY: CPT

## 2024-05-17 PROCEDURE — 74177 CT ABD & PELVIS W/CONTRAST: CPT | Performed by: RADIOLOGY

## 2024-05-17 PROCEDURE — 78306 BONE IMAGING WHOLE BODY: CPT | Performed by: NUCLEAR MEDICINE

## 2024-05-17 PROCEDURE — 2550000001 HC RX 255 CONTRASTS: Performed by: NURSE PRACTITIONER

## 2024-05-17 PROCEDURE — 74177 CT ABD & PELVIS W/CONTRAST: CPT

## 2024-05-17 RX ADMIN — IOHEXOL 75 ML: 350 INJECTION, SOLUTION INTRAVENOUS at 11:39

## 2024-05-17 RX ADMIN — TECHNETIUM TC 99M MEDRONATE 25 MILLICURIE: 25 INJECTION, POWDER, FOR SOLUTION INTRAVENOUS at 11:23

## 2024-05-19 ENCOUNTER — TELEPHONE (OUTPATIENT)
Dept: HEMATOLOGY/ONCOLOGY | Facility: HOSPITAL | Age: 64
End: 2024-05-19
Payer: COMMERCIAL

## 2024-05-19 NOTE — TELEPHONE ENCOUNTER
Call to patient to review Scans completed on 5/17/24 as reviewed with Dr Camarena showing concerning progression of breast cancer in bones. Plan to continue with current plan to meet with Dr Camarena on 5/22/24 for next steps with treatment. Questions answered. She has my contact number in the interim should she have any additional questions.

## 2024-05-21 LAB
LAB AP ASR DISCLAIMER: NORMAL
LABORATORY COMMENT REPORT: NORMAL
PATH REPORT.ADDENDUM SPEC: NORMAL
PATH REPORT.ADDENDUM SPEC: NORMAL
PATH REPORT.FINAL DX SPEC: NORMAL
PATH REPORT.GROSS SPEC: NORMAL
PATH REPORT.RELEVANT HX SPEC: NORMAL
PATH REPORT.TOTAL CANCER: NORMAL

## 2024-05-22 ENCOUNTER — ONCOLOGY MEDICATION OUTREACH (OUTPATIENT)
Dept: HEMATOLOGY/ONCOLOGY | Facility: CLINIC | Age: 64
End: 2024-05-22
Payer: COMMERCIAL

## 2024-05-22 ENCOUNTER — HOSPITAL ENCOUNTER (OUTPATIENT)
Dept: CARDIOLOGY | Facility: HOSPITAL | Age: 64
Discharge: HOME | End: 2024-05-22
Payer: COMMERCIAL

## 2024-05-22 ENCOUNTER — OFFICE VISIT (OUTPATIENT)
Dept: HEMATOLOGY/ONCOLOGY | Facility: CLINIC | Age: 64
End: 2024-05-22
Payer: COMMERCIAL

## 2024-05-22 ENCOUNTER — LAB (OUTPATIENT)
Dept: LAB | Facility: LAB | Age: 64
End: 2024-05-22
Payer: COMMERCIAL

## 2024-05-22 VITALS
WEIGHT: 185 LBS | HEIGHT: 66 IN | BODY MASS INDEX: 29.73 KG/M2 | HEART RATE: 82 BPM | DIASTOLIC BLOOD PRESSURE: 89 MMHG | SYSTOLIC BLOOD PRESSURE: 159 MMHG

## 2024-05-22 DIAGNOSIS — Z17.0 MALIGNANT NEOPLASM OF LEFT BREAST IN FEMALE, ESTROGEN RECEPTOR POSITIVE, UNSPECIFIED SITE OF BREAST (MULTI): ICD-10-CM

## 2024-05-22 DIAGNOSIS — C50.912 MALIGNANT NEOPLASM OF LEFT BREAST IN FEMALE, ESTROGEN RECEPTOR POSITIVE, UNSPECIFIED SITE OF BREAST (MULTI): ICD-10-CM

## 2024-05-22 DIAGNOSIS — C79.51: Primary | ICD-10-CM

## 2024-05-22 LAB
ALBUMIN SERPL BCP-MCNC: 4.6 G/DL (ref 3.4–5)
ALP SERPL-CCNC: 139 U/L (ref 33–136)
ALT SERPL W P-5'-P-CCNC: 14 U/L (ref 7–45)
ANION GAP SERPL CALC-SCNC: 15 MMOL/L (ref 10–20)
AST SERPL W P-5'-P-CCNC: 17 U/L (ref 9–39)
ATRIAL RATE: 76 BPM
BASOPHILS # BLD AUTO: 0.01 X10*3/UL (ref 0–0.1)
BASOPHILS NFR BLD AUTO: 0.2 %
BILIRUB SERPL-MCNC: 0.5 MG/DL (ref 0–1.2)
BUN SERPL-MCNC: 17 MG/DL (ref 6–23)
CALCIUM SERPL-MCNC: 9.9 MG/DL (ref 8.6–10.3)
CANCER AG27-29 SERPL-ACNC: 44.3 U/ML (ref 0–38.6)
CHLORIDE SERPL-SCNC: 98 MMOL/L (ref 98–107)
CO2 SERPL-SCNC: 29 MMOL/L (ref 21–32)
CREAT SERPL-MCNC: 0.97 MG/DL (ref 0.5–1.05)
EGFRCR SERPLBLD CKD-EPI 2021: 66 ML/MIN/1.73M*2
EOSINOPHIL # BLD AUTO: 0.02 X10*3/UL (ref 0–0.7)
EOSINOPHIL NFR BLD AUTO: 0.5 %
ERYTHROCYTE [DISTWIDTH] IN BLOOD BY AUTOMATED COUNT: 14.3 % (ref 11.5–14.5)
GLUCOSE SERPL-MCNC: 98 MG/DL (ref 74–99)
HCT VFR BLD AUTO: 38 % (ref 36–46)
HGB BLD-MCNC: 12.2 G/DL (ref 12–16)
IMM GRANULOCYTES # BLD AUTO: 0.02 X10*3/UL (ref 0–0.7)
IMM GRANULOCYTES NFR BLD AUTO: 0.5 % (ref 0–0.9)
LYMPHOCYTES # BLD AUTO: 0.93 X10*3/UL (ref 1.2–4.8)
LYMPHOCYTES NFR BLD AUTO: 22.6 %
MCH RBC QN AUTO: 27.8 PG (ref 26–34)
MCHC RBC AUTO-ENTMCNC: 32.1 G/DL (ref 32–36)
MCV RBC AUTO: 87 FL (ref 80–100)
MONOCYTES # BLD AUTO: 0.36 X10*3/UL (ref 0.1–1)
MONOCYTES NFR BLD AUTO: 8.8 %
NEUTROPHILS # BLD AUTO: 2.77 X10*3/UL (ref 1.2–7.7)
NEUTROPHILS NFR BLD AUTO: 67.4 %
NRBC BLD-RTO: 0 /100 WBCS (ref 0–0)
P AXIS: 71 DEGREES
P OFFSET: 195 MS
P ONSET: 138 MS
PLATELET # BLD AUTO: 319 X10*3/UL (ref 150–450)
POTASSIUM SERPL-SCNC: 3.6 MMOL/L (ref 3.5–5.3)
PR INTERVAL: 162 MS
PROT SERPL-MCNC: 7.9 G/DL (ref 6.4–8.2)
Q ONSET: 219 MS
QRS COUNT: 13 BEATS
QRS DURATION: 88 MS
QT INTERVAL: 404 MS
QTC CALCULATION(BAZETT): 454 MS
QTC FREDERICIA: 436 MS
R AXIS: 70 DEGREES
RBC # BLD AUTO: 4.39 X10*6/UL (ref 4–5.2)
SODIUM SERPL-SCNC: 138 MMOL/L (ref 136–145)
T AXIS: 65 DEGREES
T OFFSET: 421 MS
VENTRICULAR RATE: 76 BPM
WBC # BLD AUTO: 4.1 X10*3/UL (ref 4.4–11.3)

## 2024-05-22 PROCEDURE — 99215 OFFICE O/P EST HI 40 MIN: CPT | Performed by: INTERNAL MEDICINE

## 2024-05-22 PROCEDURE — 36415 COLL VENOUS BLD VENIPUNCTURE: CPT

## 2024-05-22 PROCEDURE — 86300 IMMUNOASSAY TUMOR CA 15-3: CPT

## 2024-05-22 PROCEDURE — 80053 COMPREHEN METABOLIC PANEL: CPT

## 2024-05-22 PROCEDURE — 93005 ELECTROCARDIOGRAM TRACING: CPT

## 2024-05-22 PROCEDURE — 85025 COMPLETE CBC W/AUTO DIFF WBC: CPT

## 2024-05-22 RX ORDER — EXEMESTANE 25 MG/1
25 TABLET ORAL DAILY
Qty: 30 TABLET | Refills: 11 | Status: SHIPPED | OUTPATIENT
Start: 2024-05-22 | End: 2025-05-22

## 2024-05-22 ASSESSMENT — PAIN SCALES - GENERAL: PAINLEVEL: 0-NO PAIN

## 2024-05-22 NOTE — PROGRESS NOTES
Reviewed Kisqali and exemestane dose, frequency, administration, treatment cycle, duration of therapy, and missed doses. Counseled on potential side effects including but not limited to chemotherapy side effects: neutropenia, infection risk, anemia, fatigue, weakness, low energy, thrombocytopenia, bleeding/bruising, n/v, diarrhea, and muscle or joint pain. Discussed techniques to mitigate severity of side effects such as blood count checks, temperature checks, antiemetic use, loperamide use with max dose of 8 tabs per 24 hours, and staying hydrated if having diarrhea.  Provided medication/regimen handout. All questions answered and contact information was given to patient.    Check PA status 5/29

## 2024-05-22 NOTE — PATIENT INSTRUCTIONS
Start exemestane now. And start ribociclib once it is approved by your insurance and sent to your house  Bloodwork today including tempus  Bone density test  Consultation with radiation oncology (dr Damon)  EKG today and 2wks and 4 wks after starting ribociclib  Follow-up with mike in about 6 wks

## 2024-05-23 ENCOUNTER — SPECIALTY PHARMACY (OUTPATIENT)
Dept: PHARMACY | Facility: CLINIC | Age: 64
End: 2024-05-23

## 2024-05-24 LAB
ELECTRONICALLY SIGNED BY: NORMAL
FOCUSED SOLID TUMOR DNA RESULTS: NORMAL

## 2024-05-24 ASSESSMENT — ENCOUNTER SYMPTOMS
MYALGIAS: 0
CONSTIPATION: 0
DIAPHORESIS: 0
HEADACHES: 0
WHEEZING: 0
BLOOD IN STOOL: 0
CONFUSION: 0
ADENOPATHY: 0
FEVER: 0
ABDOMINAL PAIN: 0
DEPRESSION: 0
APPETITE CHANGE: 0
DIARRHEA: 0
LEG SWELLING: 0
SHORTNESS OF BREATH: 0
RESPIRATORY NEGATIVE: 1
SLEEP DISTURBANCE: 0
CHILLS: 0
HOT FLASHES: 0
SEIZURES: 0
ABDOMINAL DISTENTION: 0
FREQUENCY: 0
CHEST TIGHTNESS: 0
EXTREMITY WEAKNESS: 0
EYES NEGATIVE: 1
EYE PROBLEMS: 0
NERVOUS/ANXIOUS: 1
DYSURIA: 0
DIFFICULTY URINATING: 0
HEMOPTYSIS: 0
SCLERAL ICTERUS: 0
COUGH: 0
NAUSEA: 0
DIZZINESS: 0
FATIGUE: 1
CARDIOVASCULAR NEGATIVE: 1
BACK PAIN: 0
NUMBNESS: 0
BRUISES/BLEEDS EASILY: 0
HEMATURIA: 0
PALPITATIONS: 0
ARTHRALGIAS: 0

## 2024-05-24 NOTE — PROGRESS NOTES
Breast Medical Oncology Clinic  Location: [facility]      BREAST CANCER DIAGNOSIS  Breast cancer, left (Multi), Clinical: Stage IIA (cT2, cN0, cM0, G3, ER+, MT+, HER2-)       ONCOLOGIC HISTORY  reatment Synopsis:    History of left-sided invasive ductal carcinoma, stage  IIA (T2 N0 M0). The patient's breast cancer was diagnosed on October 20, 2016, and is estrogen receptor positive at 95%, progesterone receptor positive at 90%, and HER-2/renae 2+ by IHC and not amplified  by FISH at 1.3.      In addition patient has an oncotype DX recurrence score of 31 translating to a 10-year distant recurrence rate of 21%.      Patient has completed a left mastectomy without reconstruction with SLNB on 11/11/2016. Pathology showed a 2.2cm mass, grade 3, with 0/7 LNs involved.     10/31/2016 genetic testing negative     4/2017 Initiated on Anastrazole     10/2018 changed to letrozole for intolerable side effects with anastrazole.     4/2022 Completed 5 year course antiestrogen therapy               Treatment History:       2016-11-11: Cancer Related Surgery:      Completed left mastectomy  2016-12-28: Chemotherapy:        Received cycle # of Taxotere and Cyclophosphamide  2017-3-1: Chemotherapy:            Completed 4 cycles of TC from 12/18/16 through to 03/01/2017 2017-4-12: New Treatment Plan:              Started arimidex  2018-10-26: New Treatment Plan:            Letrozole 2.5mg po daily started. Arimidex discontinued due to arthralgia.     CURRENT THERAPY  Planned exemestane/ribociclib    HISTORY OF PRESENT ILLNESS    Christine Malik is a 64 y.o. woman here for 1st visit with me. Newly diagnosed oligometastatic ER+ breast cancer. She had a recent left chest wall mass which revealed recurrent breast cancer grade 3 ER+ 90%/MT+ 30%/HER2 2+ but FISH negative. Had been on letrozole for about 5 yrs completed 4/2022.            Review of Systems   Constitutional:  Positive for fatigue. Negative for appetite change, chills, diaphoresis  and fever.   HENT:  Negative.  Negative for hearing loss and lump/mass.    Eyes: Negative.  Negative for eye problems and icterus.   Respiratory: Negative.  Negative for chest tightness, cough, hemoptysis, shortness of breath and wheezing.    Cardiovascular: Negative.  Negative for chest pain, leg swelling and palpitations.   Gastrointestinal:  Negative for abdominal distention, abdominal pain, blood in stool, constipation, diarrhea and nausea.   Endocrine: Negative for hot flashes.   Genitourinary:  Negative for bladder incontinence, difficulty urinating, dyspareunia, dysuria, frequency and hematuria.    Musculoskeletal:  Negative for arthralgias, back pain, gait problem and myalgias.   Neurological:  Negative for dizziness, extremity weakness, gait problem, headaches, numbness and seizures.   Hematological:  Negative for adenopathy. Does not bruise/bleed easily.   Psychiatric/Behavioral:  Negative for confusion, depression and sleep disturbance. The patient is nervous/anxious.    All other systems reviewed and are negative.        Past Medical History:  has a past medical history of Anxiety disorder, unspecified (2015), Encounter for other preprocedural examination (10/28/2016), Encounter for other screening for malignant neoplasm of breast (10/03/2016), Other abnormal and inconclusive findings on diagnostic imaging of breast (10/20/2016), Pelvic and perineal pain (10/03/2016), Personal history of malignant neoplasm of breast (2016), Personal history of other diseases of the circulatory system (2015), and Unspecified lump in the left breast, unspecified quadrant (10/20/2016).  Surgical History:   has a past surgical history that includes Dilation and curettage of uterus (2014); Hysterectomy (2014);  section, classic (2014); Other surgical history (2014); Other surgical history (2016); Lackey lymph node biopsy (2016); and Mastectomy (Left).  Social  "History:   reports that she has never smoked. She has never used smokeless tobacco. She reports that she does not drink alcohol and does not use drugs.  Family History:  No family history on file.  Family Oncology History:  Cancer-related family history is not on file.      OBJECTIVE    VS / Pain:  /89 (BP Location: Right arm, Patient Position: Sitting, BP Cuff Size: Adult)   Pulse 82   Ht (S) 1.685 m (5' 6.34\")   Wt 83.9 kg (185 lb)   BMI 29.56 kg/m²   BSA: 1.98 meters squared   Pain Scale: 0    Performance Status:  The ECOG performance scale today is ECO- Fully active, able to carry on all pre-disease performance w/o restriction.    Physical Exam      Diagnostic Results   === 24 ===    CT ABDOMEN PELVIS W IV CONTRAST    - Impression -  Cholelithiasis.    Small hiatal hernia.    No obvious metastatic disease.    MACRO:  none    Signed by: Aurea Freire 2024 1:03 PM  Dictation workstation:   [id number]      BI US breast limited left 2024    Narrative  Interpreted By:  Sherie Olson,  STUDY:  BI US BREAST LIMITED LEFT;  2024 9:50 am    ACCESSION NUMBER(S):  [id number]    ORDERING CLINICIAN:  WENDY ORDONEZ    INDICATION:  Patient presents for evaluation of chest wall masses and an  indeterminate left axillary lymph node. Patient is status post left  mastectomy.    COMPARISON:  CT study dated 05/10/2024    FINDINGS:  Targeted ultrasound was performed. In the left chest wall at the 10  o'clock position there is a heterogeneous mass measuring 4.8 x 1.9 x  2.7 cm. This is intermediate with elastography and there is internal  vascularity. This corresponds to the CT finding. In the left medial  infraclavicular region there is an oval circumscribed mass measuring  1.3 x 0.8 x 1.3 cm. There is internal vascularity and this is hard  with elastography. This corresponds to the additional CT finding.    In the left axilla there is an abnormal lymph node. The lymph node  demonstrates " cortical thickening measuring 0.8 cm. 3 additional  unremarkable lymph nodes are present.    Impression  1. Suspicious left chest wall mass at the 10 o'clock position. An  ultrasound-guided biopsy is recommended.  2. Suspicious left infraclavicular mass and left axillary lymph node.  If management will change a biopsy could be performed.      The biopsy will be performed on the study date. This was discussed  with the patient at the time of her visit with Dr. Olson.    BI-RADS CATEGORY:    BI-RADS CATEGORY:  5 Highly Suggestive of Malignancy.  Recommendation:  Surgical Consultation and Biopsy.  Recommended Date:  Immediate.  Laterality:  Left.  For any future breast imaging appointments, please call [phone] ([phone]).    MACRO:  Critical Finding:  See findings. Notification was initiated on  5/13/2024 at 10:22 am by  Sherie Olson.  (**-YCF-**) Instructions:  Surgical Consultation and Imaging Guided Biopsy.      Signed by: Sherie Olson 5/13/2024 10:27 AM  Dictation workstation:   [id number]     No images are attached to the encounter.    LABORATORY/PATHOLOGY DATA    Hospital Outpatient Visit on 05/22/2024   Component Date Value Ref Range Status    Ventricular Rate 05/22/2024 76  BPM Final    Atrial Rate 05/22/2024 76  BPM Final    RI Interval 05/22/2024 162  ms Final    QRS Duration 05/22/2024 88  ms Final    QT Interval 05/22/2024 404  ms Final    QTC Calculation(Bazett) 05/22/2024 454  ms Final    P Axis 05/22/2024 71  degrees Final    R Axis 05/22/2024 70  degrees Final    T Axis 05/22/2024 65  degrees Final    QRS Count 05/22/2024 13  beats Final    Q Onset 05/22/2024 219  ms Final    P Onset 05/22/2024 138  ms Final    P Offset 05/22/2024 195  ms Final    T Offset 05/22/2024 421  ms Final    QTC Fredericia 05/22/2024 436  ms Final   Lab on 05/22/2024   Component Date Value Ref Range Status    CA 27.29 05/22/2024 44.3 (H)  0.0 - 38.6 U/mL Final    WBC 05/22/2024 4.1 (L)  4.4 - 11.3 x10*3/uL Final     nRBC 05/22/2024 0.0  0.0 - 0.0 /100 WBCs Final    RBC 05/22/2024 4.39  4.00 - 5.20 x10*6/uL Final    Hemoglobin 05/22/2024 12.2  12.0 - 16.0 g/dL Final    Hematocrit 05/22/2024 38.0  36.0 - 46.0 % Final    MCV 05/22/2024 87  80 - 100 fL Final    MCH 05/22/2024 27.8  26.0 - 34.0 pg Final    MCHC 05/22/2024 32.1  32.0 - 36.0 g/dL Final    RDW 05/22/2024 14.3  11.5 - 14.5 % Final    Platelets 05/22/2024 319  150 - 450 x10*3/uL Final    Neutrophils % 05/22/2024 67.4  40.0 - 80.0 % Final    Immature Granulocytes %, Automated 05/22/2024 0.5  0.0 - 0.9 % Final    Lymphocytes % 05/22/2024 22.6  13.0 - 44.0 % Final    Monocytes % 05/22/2024 8.8  2.0 - 10.0 % Final    Eosinophils % 05/22/2024 0.5  0.0 - 6.0 % Final    Basophils % 05/22/2024 0.2  0.0 - 2.0 % Final    Neutrophils Absolute 05/22/2024 2.77  1.20 - 7.70 x10*3/uL Final    Immature Granulocytes Absolute, Au* 05/22/2024 0.02  0.00 - 0.70 x10*3/uL Final    Lymphocytes Absolute 05/22/2024 0.93 (L)  1.20 - 4.80 x10*3/uL Final    Monocytes Absolute 05/22/2024 0.36  0.10 - 1.00 x10*3/uL Final    Eosinophils Absolute 05/22/2024 0.02  0.00 - 0.70 x10*3/uL Final    Basophils Absolute 05/22/2024 0.01  0.00 - 0.10 x10*3/uL Final    Glucose 05/22/2024 98  74 - 99 mg/dL Final    Sodium 05/22/2024 138  136 - 145 mmol/L Final    Potassium 05/22/2024 3.6  3.5 - 5.3 mmol/L Final    Chloride 05/22/2024 98  98 - 107 mmol/L Final    Bicarbonate 05/22/2024 29  21 - 32 mmol/L Final    Anion Gap 05/22/2024 15  10 - 20 mmol/L Final    Urea Nitrogen 05/22/2024 17  6 - 23 mg/dL Final    Creatinine 05/22/2024 0.97  0.50 - 1.05 mg/dL Final    eGFR 05/22/2024 66  >60 mL/min/1.73m*2 Final    Calcium 05/22/2024 9.9  8.6 - 10.3 mg/dL Final    Albumin 05/22/2024 4.6  3.4 - 5.0 g/dL Final    Alkaline Phosphatase 05/22/2024 139 (H)  33 - 136 U/L Final    Total Protein 05/22/2024 7.9  6.4 - 8.2 g/dL Final    AST 05/22/2024 17  9 - 39 U/L Final    Bilirubin, Total 05/22/2024 0.5  0.0 - 1.2 mg/dL Final     ALT 05/22/2024 14  7 - 45 U/L Final   Orders Only on 05/16/2024   Component Date Value Ref Range Status    Test Comment 05/16/2024 The test request has been received by the lab and will be reviewed.   Final   Hospital Outpatient Visit on 05/13/2024   Component Date Value Ref Range Status    Case Report 05/13/2024    Final                    Value:Surgical Pathology                                Case: [id number]                                  Authorizing Provider:  Alize Hernandez,        Collected:           05/13/2024 1034                                     APRN-CNP                                                                     Ordering Location:     [facility]       Received:            05/13/2024 1527                                     Center                                                                       Pathologist:           Gabby Mcleod MD                                                           Specimen:    BREAST CORE BIOPSY LEFT, left chest wall mass 10:00                                        FINAL DIAGNOSIS 05/13/2024    Final                    Value:This result contains rich text formatting which cannot be displayed here.      05/13/2024    Final                    Value:This result contains rich text formatting which cannot be displayed here.    Addendum 05/13/2024    Final                    Value:This result contains rich text formatting which cannot be displayed here.    Addendum 05/13/2024    Final                    Value:This result contains rich text formatting which cannot be displayed here.    Clinical History 05/13/2024    Final                    Value:This result contains rich text formatting which cannot be displayed here.    Gross Description 05/13/2024    Final                    Value:This result contains rich text formatting which cannot be displayed here.    Disclaimer 05/13/2024    Final                    Value:This result contains rich text  formatting which cannot be displayed here.   Lab on 04/24/2024   Component Date Value Ref Range Status    WBC 04/24/2024 4.3 (L)  4.4 - 11.3 x10*3/uL Final    nRBC 04/24/2024 0.0  0.0 - 0.0 /100 WBCs Final    RBC 04/24/2024 4.30  4.00 - 5.20 x10*6/uL Final    Hemoglobin 04/24/2024 12.2  12.0 - 16.0 g/dL Final    Hematocrit 04/24/2024 38.1  36.0 - 46.0 % Final    MCV 04/24/2024 89  80 - 100 fL Final    MCH 04/24/2024 28.4  26.0 - 34.0 pg Final    MCHC 04/24/2024 32.0  32.0 - 36.0 g/dL Final    RDW 04/24/2024 13.7  11.5 - 14.5 % Final    Platelets 04/24/2024 283  150 - 450 x10*3/uL Final    Neutrophils % 04/24/2024 60.1  40.0 - 80.0 % Final    Immature Granulocytes %, Automated 04/24/2024 0.5  0.0 - 0.9 % Final    Lymphocytes % 04/24/2024 26.3  13.0 - 44.0 % Final    Monocytes % 04/24/2024 12.0  2.0 - 10.0 % Final    Eosinophils % 04/24/2024 0.9  0.0 - 6.0 % Final    Basophils % 04/24/2024 0.2  0.0 - 2.0 % Final    Neutrophils Absolute 04/24/2024 2.56  1.20 - 7.70 x10*3/uL Final    Immature Granulocytes Absolute, Au* 04/24/2024 0.02  0.00 - 0.70 x10*3/uL Final    Lymphocytes Absolute 04/24/2024 1.12 (L)  1.20 - 4.80 x10*3/uL Final    Monocytes Absolute 04/24/2024 0.51  0.10 - 1.00 x10*3/uL Final    Eosinophils Absolute 04/24/2024 0.04  0.00 - 0.70 x10*3/uL Final    Basophils Absolute 04/24/2024 0.01  0.00 - 0.10 x10*3/uL Final    Glucose 04/24/2024 104 (H)  74 - 99 mg/dL Final    Sodium 04/24/2024 137  136 - 145 mmol/L Final    Potassium 04/24/2024 4.5  3.5 - 5.3 mmol/L Final    Chloride 04/24/2024 101  98 - 107 mmol/L Final    Bicarbonate 04/24/2024 29  21 - 32 mmol/L Final    Anion Gap 04/24/2024 12  10 - 20 mmol/L Final    Urea Nitrogen 04/24/2024 23  6 - 23 mg/dL Final    Creatinine 04/24/2024 0.91  0.50 - 1.05 mg/dL Final    eGFR 04/24/2024 71  >60 mL/min/1.73m*2 Final    Calcium 04/24/2024 9.3  8.6 - 10.3 mg/dL Final    Albumin 04/24/2024 4.3  3.4 - 5.0 g/dL Final    Alkaline Phosphatase 04/24/2024 117  33 - 136  U/L Final    Total Protein 2024 7.2  6.4 - 8.2 g/dL Final    AST 2024 18  9 - 39 U/L Final    Bilirubin, Total 2024 0.4  0.0 - 1.2 mg/dL Final    ALT 2024 15  7 - 45 U/L Final      Lab Results   Component Value Date    LABCA2 44.3 (H) 2024       FINAL DIAGNOSIS   A. Left chest wall mass, 10:00, ultrasound guided core needle biopsy:  -- Invasive ductal carcinoma, grade 3 involving fibrous tissue and skeletal muscle, see note.     Note:  In this limited sample, the invasive carcinoma measures up to 12 mm in greatest dimension.  ER, MN and HER2 will be reported in an addendum.      Electronically signed by Gabby Mcleod MD on 2024 at 1008      [facility]   By the signature on this report, the individual or group listed as making the Final Interpretation/Diagnosis certifies that they have reviewed this case.    Addendum   Surgical/Block Number:        [id number]  Specimen Site:         Left chest wall mass  Specimen Type:       core needle biopsy        Estrogen Receptor (clone SP1):                     Positive                                                                          Percentage with nuclear stainin-95%                                                                          Intensity of staining: Moderate to Strong     Progesterone Receptor (clone SP2): Positive                                                                         Percentage with nuclear stainin-40%                                                                          Intensity of staining: Moderate to Strong     HER2 (clone [id number]):                                                                                                                    Equivocal (2+).  JESSICA is pending and will be reported in addendum.                                                                            Comment:         Addendum electronically signed by Gabby Mcleod MD on 2024 at 1014   [facility]    HER2 DUAL JESSICA FOR DETECTION OF HER2 GENE AMPLIFICATION  HER2 Dual JESSICA DNA Probe Cocktail Assay from [employer] ([employer])    Results are expressed as the averaged ratio HER2/chromosome 17 signals in 20 nuclei.        Source of Specimen: Left chest wall mass biopsy  Paraffin Block: [id number]  Duration of fixation:  unless otherwise noted, 6-72 hours fixation     TEST RESULTS:    Number of tumor cells counted:  20  Number of observers: 1  Average number of HER2 signals/nucleus:  2.6  Average number of CEP 17 signals/nucleus: 2.1  Ratio of average HER2/CEP 17:  1.2     INTERPRETATION:       Negative (Not amplified)         IMPRESSION/PLAN    Recurrent oligometastatic ER+/DE+/HER2 2+ breast cancer with chest wall/sternal mass and left axillary and intrathoracic nodes. Recommendations are to start exemestane now, add ribociclib, check NGS somatic mutation analysis, EKG, and baseline DEXA scan. If osteopenia, will consider adding q6 month zometa. No active dental issues. Will refer to radiation oncology to discuss the role of radiation in the future if she has a good response to AI-CDK4/6 therapy.  RTC in about 5-6 wks        We discussed the clinical significance of diagnosis, goals of care and treatment plan in detail.     I personally spent over half of a total 40 minutes face to face with the patient in counseling and discussion and/or coordination of care as described above.         -------------------------------------------------------------------------------------------------------------------------------  Keshav Camarena MD  Director of Breast Cancer Medical Oncology Research Program   [facility]  Professor of Medicine  [facility] [address]  [place] [address]  Phone: [phone]  [email or web address]

## 2024-05-31 ENCOUNTER — TELEPHONE (OUTPATIENT)
Dept: HEMATOLOGY/ONCOLOGY | Facility: HOSPITAL | Age: 64
End: 2024-05-31
Payer: COMMERCIAL

## 2024-05-31 NOTE — TELEPHONE ENCOUNTER
I called patient and she still hasn't received ribociclib. I will work with speciality pharmacy to see how to facilitate approval of CDK4/6  
show

## 2024-06-03 DIAGNOSIS — C50.912 MALIGNANT NEOPLASM OF LEFT BREAST IN FEMALE, ESTROGEN RECEPTOR POSITIVE, UNSPECIFIED SITE OF BREAST (MULTI): ICD-10-CM

## 2024-06-03 DIAGNOSIS — Z17.0 MALIGNANT NEOPLASM OF LEFT BREAST IN FEMALE, ESTROGEN RECEPTOR POSITIVE, UNSPECIFIED SITE OF BREAST (MULTI): ICD-10-CM

## 2024-06-10 ENCOUNTER — SOCIAL WORK (OUTPATIENT)
Dept: HEMATOLOGY/ONCOLOGY | Facility: CLINIC | Age: 64
End: 2024-06-10

## 2024-06-10 ENCOUNTER — HOSPITAL ENCOUNTER (OUTPATIENT)
Dept: RADIATION ONCOLOGY | Facility: CLINIC | Age: 64
Discharge: HOME | End: 2024-06-10
Payer: COMMERCIAL

## 2024-06-10 VITALS
SYSTOLIC BLOOD PRESSURE: 144 MMHG | HEART RATE: 80 BPM | DIASTOLIC BLOOD PRESSURE: 89 MMHG | OXYGEN SATURATION: 97 % | WEIGHT: 188.71 LBS | BODY MASS INDEX: 30.15 KG/M2 | TEMPERATURE: 94.5 F

## 2024-06-10 DIAGNOSIS — C50.912 MALIGNANT NEOPLASM OF LEFT BREAST IN FEMALE, ESTROGEN RECEPTOR POSITIVE, UNSPECIFIED SITE OF BREAST (MULTI): ICD-10-CM

## 2024-06-10 DIAGNOSIS — Z17.0 MALIGNANT NEOPLASM OF LEFT BREAST IN FEMALE, ESTROGEN RECEPTOR POSITIVE, UNSPECIFIED SITE OF BREAST (MULTI): ICD-10-CM

## 2024-06-10 PROCEDURE — 99204 OFFICE O/P NEW MOD 45 MIN: CPT | Performed by: RADIOLOGY

## 2024-06-10 PROCEDURE — 99214 OFFICE O/P EST MOD 30 MIN: CPT | Performed by: RADIOLOGY

## 2024-06-10 ASSESSMENT — ENCOUNTER SYMPTOMS
MUSCULOSKELETAL NEGATIVE: 1
EYES NEGATIVE: 1
CARDIOVASCULAR NEGATIVE: 1
RESPIRATORY NEGATIVE: 1
PSYCHIATRIC NEGATIVE: 1
NEUROLOGICAL NEGATIVE: 1
CONSTITUTIONAL NEGATIVE: 1
GASTROINTESTINAL NEGATIVE: 1
ENDOCRINE NEGATIVE: 1
HEMATOLOGIC/LYMPHATIC NEGATIVE: 1

## 2024-06-10 ASSESSMENT — PAIN SCALES - GENERAL: PAINLEVEL: 0-NO PAIN

## 2024-06-10 NOTE — PROGRESS NOTES
Radiation Oncology Outpatient Consult    Patient Name:  Christine Malik  MRN:  [id number]  :  1960    Referring Provider: Keshav Camarena MD  Primary Care Provider: Octaviano Mares MD  Care Team: Patient Care Team:  Octaviano Mares MD as PCP - General (Internal Medicine)  Keshav Camarena MD as Consulting Physician (Hematology and Oncology)    Date of Service: 6/10/2024     SUBJECTIVE  History of Present Illness:  Christine Malik is a 64 y.o. female who was referred by Keshav Camarena MD, for a consultation to the [facility] Department of Radiation Oncology.  She is presenting for evaluation and management of her chest wall recurrence.    Ms. Malik is a 64-year-old female who I met back in  when she was diagnosed with a T2 N0 M0 invasive mammary carcinoma of the left breast.  She underwent a left mastectomy with sentinel lymph node biopsy.  Pathology revealed a 2.2 cm cancer.  Estrogen and progesterone receptors stained positive.  HER2/renae was negative.  Her Oncotype was 31.  She took adjuvant TC x 4 followed by endocrine therapy.  She initially started with anastrozole although switched to letrozole which was completed in 2022.  She underwent a right-sided mammogram on 2023 which was negative.  She recently started developing itching over the left chest wall.  She underwent a CT scan of the chest on 5/10/2024 which revealed stable subcentimeter lung nodules.  There was a 0.6 cm left upper anterior mediastinal lymph node.  There was a soft tissue mass measuring 3.4 cm along the internal mammary tamra chain.  To my eye there is a level 1 axillary lymph node.  She underwent a biopsy on 2024 which revealed invasive ductal carcinoma, grade 3 involving fibrous tissue and skeletal muscle.  Estrogen receptors stained positive at 90 to 95%.  Progesterone receptors stained positive at 30 to 40%.  HER2/renae was 2+ on IHC and 1.2 on dual-estuardo.  CT  scan of the abdomen and pelvis performed 2024 was negative for metastatic disease.  Bone scan performed 2024 revealed focal radiotracer accumulation along the left lateral aspect of the sternum.  She saw Dr. Camarena who recommended ribociclib.  I was asked to see Ms. Davis by Dr. Keshva Camarena for a discussion regarding the role of radiation in the management of this chest wall/internal mammary node recurrence.    Prior Radiotherapy:  No radiation treatments to show. (Treatments may have been administered in another system.)       Past Medical History:    Past Medical History:   Diagnosis Date    Anxiety disorder, unspecified 2015    Anxiety    Encounter for other preprocedural examination 10/28/2016    Preoperative testing    Encounter for other screening for malignant neoplasm of breast 10/03/2016    Breast cancer screening    Other abnormal and inconclusive findings on diagnostic imaging of breast 10/20/2016    Abnormal mammogram    Pelvic and perineal pain 10/03/2016    Female pelvic pain    Personal history of malignant neoplasm of breast 2016    History of malignant neoplasm of breast    Personal history of other diseases of the circulatory system 2015    History of hypertension    Unspecified lump in the left breast, unspecified quadrant 10/20/2016    Left breast mass        Past Surgical History:    Past Surgical History:   Procedure Laterality Date     SECTION, CLASSIC  2014     Section    DILATION AND CURETTAGE OF UTERUS  2014    Dilation And Curettage    HYSTERECTOMY  2014    Hysterectomy    MASTECTOMY Left     OTHER SURGICAL HISTORY  2014    Wrist Excision Of Ganglion    OTHER SURGICAL HISTORY  2016    Simple Mastectomy Left Breast    SENTINEL LYMPH NODE BIOPSY  2016    [place] Lymph Node Biopsy        Family History:  Cancer-related family history is not on file.    Social History:    Social History     Tobacco Use     Smoking status: Never    Smokeless tobacco: Never   Substance Use Topics    Alcohol use: Never    Drug use: Never     Gynecologic History: Menarche age 14.  .  She delivered her first child at the age of 27.  She is uncertain as to when she went through menopause.  She denies any history of oral contraceptive pill usage or hormone replacement therapy.    Allergies:  No Known Allergies     Medications:    Current Outpatient Medications:     ALPRAZolam (Xanax) 0.25 mg tablet, Take 1 tablet (0.25 mg) by mouth if needed for anxiety for up to 2 doses. Please take 1-2 tablets as needed for procedural anxiety, Disp: 6 tablet, Rfl: 0    cholecalciferol (Vitamin D-3) 25 MCG (1000 UT) tablet, Take 1 tablet (25 mcg) by mouth once daily., Disp: , Rfl:     cholestyramine (Questran) 4 gram packet, Take 1 packet (4 g) by mouth., Disp: , Rfl:     exemestane (Aromasin) 25 mg tablet, Take 1 tablet (25 mg total) by mouth once daily.  Take after a meal.  Try to take at the same time each day., Disp: 30 tablet, Rfl: 11    ferrous sulfate, 325 mg ferrous sulfate, tablet, Take by mouth., Disp: , Rfl:     fish oil concentrate (Omega-3) 120-180 mg capsule, Take by mouth., Disp: , Rfl:     hydroCHLOROthiazide (HYDRODiuril) 25 mg tablet, Take 1 tablet (25 mg) by mouth once daily., Disp: , Rfl:     L. acidophilus/Bifid. animalis 32 billion cell capsule, Take by mouth., Disp: , Rfl:     loratadine 10 mg capsule, Take 10 mg by mouth once daily., Disp: , Rfl:     multivitamin (Daily Multi-Vitamin) tablet, Take 1 tablet by mouth once daily., Disp: , Rfl:     pantoprazole (ProtoNix) 40 mg EC tablet, Take 1 tablet (40 mg) by mouth once daily., Disp: , Rfl:     ribociclib (Kisqali) 3 x 200 mg tablet therapy pack, Take 3 tablets (600 mg total) by mouth in the morning for 21 days, followed by 7 days off per 28-day treatment cycle., Disp: 63 each, Rfl: 5    sertraline (Zoloft) 50 mg tablet, Take 1 tablet (50 mg) by mouth once daily., Disp: , Rfl:        Review of Systems:  Review of Systems   Constitutional: Negative.    HENT:  Negative.     Eyes: Negative.    Respiratory: Negative.     Cardiovascular: Negative.    Gastrointestinal: Negative.    Endocrine: Negative.    Genitourinary: Negative.     Musculoskeletal: Negative.    Skin: Negative.         She did have itching over her chest wall which lasted for 2 days however this has resolved.   Neurological: Negative.    Hematological: Negative.    Psychiatric/Behavioral: Negative.         Performance Status:  The Karnofsky performance scale today is 100, Fully active, able to carry on all pre-disease performed without restriction (ECOG equivalent 0).        OBJECTIVE  /89   Pulse 80   Temp 34.7 °C (94.5 °F)   Wt 85.6 kg (188 lb 11.4 oz)   SpO2 97%   BMI 30.15 kg/m² She denies pain.  Physical Exam  Constitutional:       Appearance: Normal appearance.   HENT:      Head: Normocephalic and atraumatic.   Eyes:      Conjunctiva/sclera: Conjunctivae normal.   Chest:      Comments: Examination of the chest reveals some thickening medially.  There is no pain to palpation.  I am unable to appreciate any cervical, supraclavicular or axillary lymphadenopathy.  Lymphadenopathy:      Upper Body:      Right upper body: No supraclavicular adenopathy.      Left upper body: No supraclavicular or axillary adenopathy.   Neurological:      Mental Status: She is alert.          Laboratory Review:  There are no laboratory contraindications to radiation therapy.      Pathology Review:  The pertinent pathology results were reviewed and discussed with the patient.  See history for details.    Imaging:  The pertinent imaging results were reviewed and discussed with the patient.  See history for details.       ASSESSMENT:   Christine Malik is a 64 y.o. female with Breast cancer, left (Multi), Clinical: Stage IIA (cT2, cN0, cM0, G3, ER+, MD+, HER2-).  She is status post mastectomy, adjuvant chemotherapy and endocrine therapy.  She  now has an internal mammary tamra/chest wall recurrence and is currently receiving ribociclib.     PLAN:   I had a long conversation with the patient and her  detailing the role of aggressive palliation in the treating of this oligometastatic disease.  We discussed stereotactic body radiotherapy to the sternal lesion following maximal response to Kisquali.  The side effects of radiation discussed included but were not limited to skin irritation with possible breakdown, fatigue, pulmonary toxicity, cardiac toxicity and the potential for sternal fracture.  He voices understanding and I will see her back based on the response to her systemic therapy.    NCCN Guidelines were applicable to guide this patients treatment plan.   Carolina Damon MD

## 2024-06-11 DIAGNOSIS — Z17.0 MALIGNANT NEOPLASM OF LEFT BREAST IN FEMALE, ESTROGEN RECEPTOR POSITIVE, UNSPECIFIED SITE OF BREAST (MULTI): ICD-10-CM

## 2024-06-11 DIAGNOSIS — C50.912 MALIGNANT NEOPLASM OF LEFT BREAST IN FEMALE, ESTROGEN RECEPTOR POSITIVE, UNSPECIFIED SITE OF BREAST (MULTI): ICD-10-CM

## 2024-06-11 NOTE — PROGRESS NOTES
SW was asked to reach out to patient to discuss coverage and assistance with Kisqali. SW able to reach patient on the phone. SW introduced self and explained role within the multidisciplinary team at [facility]. Ms. Malik with breast cancer with recent progression. Patient has been prescribed Kisqali. SW explained the coverage, so-pays and deductibles. Patient with high co-pay. Patient with commercial insurance thus able to apply for a -0- co-pay card for the Kisqali with [employer]. SW offered to mail/email information about the program. Patient prefers email. SW to email patient along with my contact information.    Walk in

## 2024-06-14 ENCOUNTER — TELEPHONE (OUTPATIENT)
Dept: HEMATOLOGY/ONCOLOGY | Facility: HOSPITAL | Age: 64
End: 2024-06-14
Payer: COMMERCIAL

## 2024-06-14 NOTE — TELEPHONE ENCOUNTER
Per Dr. Camarena, OK to start on Sunday after her party.    Called Christine back, Understanding verbalized with teach back. No further needs at this time.

## 2024-06-14 NOTE — TELEPHONE ENCOUNTER
Christine calls today to inform the team that she received her Kisqali today and couldn't remember if she was supposed to call or if [employer] would be reaching out to her.    If person, she would like to start on Sunday. She is hosting a 40-person party tomorrow and would prefer to start after that.    Message sent to team.

## 2024-06-17 ENCOUNTER — ONCOLOGY MEDICATION OUTREACH (OUTPATIENT)
Dept: HEMATOLOGY/ONCOLOGY | Facility: CLINIC | Age: 64
End: 2024-06-17
Payer: COMMERCIAL

## 2024-06-24 ENCOUNTER — TELEPHONE (OUTPATIENT)
Dept: HEMATOLOGY/ONCOLOGY | Facility: HOSPITAL | Age: 64
End: 2024-06-24

## 2024-06-24 DIAGNOSIS — C50.912 MALIGNANT NEOPLASM OF LEFT BREAST IN FEMALE, ESTROGEN RECEPTOR POSITIVE, UNSPECIFIED SITE OF BREAST (MULTI): Primary | ICD-10-CM

## 2024-06-24 DIAGNOSIS — Z17.0 MALIGNANT NEOPLASM OF LEFT BREAST IN FEMALE, ESTROGEN RECEPTOR POSITIVE, UNSPECIFIED SITE OF BREAST (MULTI): Primary | ICD-10-CM

## 2024-06-28 ENCOUNTER — OFFICE VISIT (OUTPATIENT)
Dept: HEMATOLOGY/ONCOLOGY | Facility: HOSPITAL | Age: 64
End: 2024-06-28
Payer: COMMERCIAL

## 2024-06-28 ENCOUNTER — LAB (OUTPATIENT)
Dept: LAB | Facility: LAB | Age: 64
End: 2024-06-28
Payer: COMMERCIAL

## 2024-06-28 ENCOUNTER — HOSPITAL ENCOUNTER (OUTPATIENT)
Dept: CARDIOLOGY | Facility: HOSPITAL | Age: 64
Discharge: HOME | End: 2024-06-28
Payer: COMMERCIAL

## 2024-06-28 VITALS
OXYGEN SATURATION: 93 % | RESPIRATION RATE: 18 BRPM | BODY MASS INDEX: 29.97 KG/M2 | WEIGHT: 187.61 LBS | TEMPERATURE: 97.3 F | SYSTOLIC BLOOD PRESSURE: 139 MMHG | HEART RATE: 98 BPM | DIASTOLIC BLOOD PRESSURE: 83 MMHG

## 2024-06-28 DIAGNOSIS — Z17.0 MALIGNANT NEOPLASM OF LEFT BREAST IN FEMALE, ESTROGEN RECEPTOR POSITIVE, UNSPECIFIED SITE OF BREAST (MULTI): ICD-10-CM

## 2024-06-28 DIAGNOSIS — Z90.12 HISTORY OF LEFT MASTECTOMY: ICD-10-CM

## 2024-06-28 DIAGNOSIS — R11.2 CHEMOTHERAPY INDUCED NAUSEA AND VOMITING: ICD-10-CM

## 2024-06-28 DIAGNOSIS — Z79.811 AROMATASE INHIBITOR USE: ICD-10-CM

## 2024-06-28 DIAGNOSIS — T45.1X5A CHEMOTHERAPY INDUCED NAUSEA AND VOMITING: ICD-10-CM

## 2024-06-28 DIAGNOSIS — C79.89 CHEST WALL RECURRENCE OF BREAST CANCER, LEFT (MULTI): ICD-10-CM

## 2024-06-28 DIAGNOSIS — C50.912 CHEST WALL RECURRENCE OF BREAST CANCER, LEFT (MULTI): ICD-10-CM

## 2024-06-28 DIAGNOSIS — R53.83 CHEMOTHERAPY-INDUCED FATIGUE: ICD-10-CM

## 2024-06-28 DIAGNOSIS — C50.912 MALIGNANT NEOPLASM OF LEFT BREAST IN FEMALE, ESTROGEN RECEPTOR POSITIVE, UNSPECIFIED SITE OF BREAST (MULTI): Primary | ICD-10-CM

## 2024-06-28 DIAGNOSIS — Z17.0 MALIGNANT NEOPLASM OF LEFT BREAST IN FEMALE, ESTROGEN RECEPTOR POSITIVE, UNSPECIFIED SITE OF BREAST (MULTI): Primary | ICD-10-CM

## 2024-06-28 DIAGNOSIS — T45.1X5A CHEMOTHERAPY-INDUCED FATIGUE: ICD-10-CM

## 2024-06-28 DIAGNOSIS — C50.912 MALIGNANT NEOPLASM OF LEFT BREAST IN FEMALE, ESTROGEN RECEPTOR POSITIVE, UNSPECIFIED SITE OF BREAST (MULTI): ICD-10-CM

## 2024-06-28 DIAGNOSIS — Z51.11 ENCOUNTER FOR ANTINEOPLASTIC CHEMOTHERAPY: ICD-10-CM

## 2024-06-28 LAB
ALBUMIN SERPL BCP-MCNC: 4.2 G/DL (ref 3.4–5)
ALP SERPL-CCNC: 122 U/L (ref 33–136)
ALT SERPL W P-5'-P-CCNC: 18 U/L (ref 7–45)
ANION GAP SERPL CALC-SCNC: 13 MMOL/L (ref 10–20)
AST SERPL W P-5'-P-CCNC: 15 U/L (ref 9–39)
ATRIAL RATE: 90 BPM
BASOPHILS # BLD AUTO: 0.02 X10*3/UL (ref 0–0.1)
BASOPHILS NFR BLD AUTO: 0.6 %
BILIRUB SERPL-MCNC: 0.3 MG/DL (ref 0–1.2)
BUN SERPL-MCNC: 21 MG/DL (ref 6–23)
CALCIUM SERPL-MCNC: 9.5 MG/DL (ref 8.6–10.6)
CANCER AG27-29 SERPL-ACNC: 39.3 U/ML (ref 0–38.6)
CHLORIDE SERPL-SCNC: 100 MMOL/L (ref 98–107)
CO2 SERPL-SCNC: 30 MMOL/L (ref 21–32)
CREAT SERPL-MCNC: 1.19 MG/DL (ref 0.5–1.05)
EGFRCR SERPLBLD CKD-EPI 2021: 51 ML/MIN/1.73M*2
EOSINOPHIL # BLD AUTO: 0.05 X10*3/UL (ref 0–0.7)
EOSINOPHIL NFR BLD AUTO: 1.5 %
ERYTHROCYTE [DISTWIDTH] IN BLOOD BY AUTOMATED COUNT: 15.9 % (ref 11.5–14.5)
GLUCOSE SERPL-MCNC: 123 MG/DL (ref 74–99)
HCT VFR BLD AUTO: 34.3 % (ref 36–46)
HGB BLD-MCNC: 10.9 G/DL (ref 12–16)
IMM GRANULOCYTES # BLD AUTO: 0.03 X10*3/UL (ref 0–0.7)
IMM GRANULOCYTES NFR BLD AUTO: 0.9 % (ref 0–0.9)
LYMPHOCYTES # BLD AUTO: 0.69 X10*3/UL (ref 1.2–4.8)
LYMPHOCYTES NFR BLD AUTO: 20.7 %
MCH RBC QN AUTO: 28.3 PG (ref 26–34)
MCHC RBC AUTO-ENTMCNC: 31.8 G/DL (ref 32–36)
MCV RBC AUTO: 89 FL (ref 80–100)
MONOCYTES # BLD AUTO: 0.16 X10*3/UL (ref 0.1–1)
MONOCYTES NFR BLD AUTO: 4.8 %
NEUTROPHILS # BLD AUTO: 2.39 X10*3/UL (ref 1.2–7.7)
NEUTROPHILS NFR BLD AUTO: 71.5 %
NRBC BLD-RTO: 0 /100 WBCS (ref 0–0)
OVALOCYTES BLD QL SMEAR: NORMAL
P AXIS: 43 DEGREES
P OFFSET: 189 MS
P ONSET: 145 MS
PLATELET # BLD AUTO: 254 X10*3/UL (ref 150–450)
POTASSIUM SERPL-SCNC: 4 MMOL/L (ref 3.5–5.3)
PR INTERVAL: 150 MS
PROT SERPL-MCNC: 7.4 G/DL (ref 6.4–8.2)
Q ONSET: 220 MS
QRS COUNT: 15 BEATS
QRS DURATION: 76 MS
QT INTERVAL: 378 MS
QTC CALCULATION(BAZETT): 462 MS
QTC FREDERICIA: 432 MS
R AXIS: 24 DEGREES
RBC # BLD AUTO: 3.85 X10*6/UL (ref 4–5.2)
RBC MORPH BLD: NORMAL
SODIUM SERPL-SCNC: 139 MMOL/L (ref 136–145)
T AXIS: 50 DEGREES
T OFFSET: 409 MS
VENTRICULAR RATE: 90 BPM
WBC # BLD AUTO: 3.3 X10*3/UL (ref 4.4–11.3)

## 2024-06-28 PROCEDURE — 99215 OFFICE O/P EST HI 40 MIN: CPT | Performed by: NURSE PRACTITIONER

## 2024-06-28 PROCEDURE — 80053 COMPREHEN METABOLIC PANEL: CPT

## 2024-06-28 PROCEDURE — 93005 ELECTROCARDIOGRAM TRACING: CPT

## 2024-06-28 PROCEDURE — 86300 IMMUNOASSAY TUMOR CA 15-3: CPT

## 2024-06-28 PROCEDURE — 85025 COMPLETE CBC W/AUTO DIFF WBC: CPT

## 2024-06-28 RX ORDER — EXEMESTANE 25 MG/1
25 TABLET ORAL DAILY
Qty: 30 TABLET | Refills: 11 | Status: SHIPPED | OUTPATIENT
Start: 2024-06-28 | End: 2025-06-28

## 2024-06-28 RX ORDER — ONDANSETRON HYDROCHLORIDE 8 MG/1
8 TABLET, FILM COATED ORAL EVERY 6 HOURS PRN
Qty: 30 TABLET | Refills: 1 | Status: SHIPPED | OUTPATIENT
Start: 2024-06-28 | End: 2024-07-19

## 2024-06-28 ASSESSMENT — PAIN SCALES - GENERAL: PAINLEVEL: 0-NO PAIN

## 2024-06-28 NOTE — PATIENT INSTRUCTIONS
Planned repeat labs and EKG between 7/10-7/12/2024 at the [facility]     Please continue daily Exemestane - rx updated to [facility] of preference     Please continue 21 days on 600 mg Kisqali    Planned Labs and follow up at [facility] with Migue / David on 8/7/24    Please call [phone] with any questions or concerns prior to your next office visit.

## 2024-06-28 NOTE — PROGRESS NOTES
Breast Medical Oncology Clinic  Location: [facility]      BREAST CANCER DIAGNOSIS  Breast cancer, left (Multi), Clinical: Stage IIA (cT2, cN0, cM0, G3, ER+, UT+, HER2-)       ONCOLOGIC HISTORY  Treatment Synopsis:    History of left-sided invasive ductal carcinoma, stage  IIA (T2 N0 M0). The patient's breast cancer was diagnosed on October 20, 2016, and is estrogen receptor positive at 95%, progesterone receptor positive at 90%, and HER-2/renae 2+ by IHC and not amplified  by FISH at 1.3.      In addition patient has an oncotype DX recurrence score of 31 translating to a 10-year distant recurrence rate of 21%.      Patient has completed a left mastectomy without reconstruction with SLNB on 11/11/2016. Pathology showed a 2.2cm mass, grade 3, with 0/7 LNs involved.     10/31/2016 genetic testing negative    3/17/2017 Completion Adjuvant TC X4      4/2017 Initiated on Anastrazole     10/2018 changed to letrozole for intolerable side effects with anastrazole.     4/2022 Completed 5 year course antiestrogen therapy     May 2024 Left chest wall recurrence grade 3 ER+ 90%/UT+ 30%/HER2 2+ but FISH negative     5/23/24 Initiated on new therapy Exemestane and Ribociclib 6/16/24 with Dr Camarena       CURRENT THERAPY  exemestane/ribociclib    HISTORY OF PRESENT ILLNESS    Christine Malik is a 64 y.o. woman here for breast cancer treatment follow up. She is accompanied today by . Has completed labs and EKG- all pending.   Initiated on daily exemestane and ribociclib for Newly diagnosed oligometastatic ER+ breast cancer recurrence in the left chest wall/. Is grade 3 ER+ 90%/UT+ 30%/HER2 2+ but FISH negative.     She reports daily exemestane is going ok, has been on this since 5/23. Is on week 2 of Kisqali. She reports with Kisqali there is some mild nausea and gassy bloated feeling. She reports the nausea has only happened a few time and does notice when taking it with food nausea is less. She is baseline on a PPI for reflux.  She reports a few episodes of diarrhea however now it is just loose and sometime is a larger quantity than she is used to prior to oral chemo. She feels appetite is stable as is weight. She is drinking about 6 8 oz glasses of water per day.     She reports + fatigue is present but stable. She is not requiring a nap when home from work- has taken a few naps since starting Kisqali. She reports sleep at night is stable.     She denies any chest pain or breathing issues, no new cough or sob. She reports a sensation of a mild scratchy throat. She feels it is is not bothersome.      She reports baseline vertigo about 2-3 times per year. She reports mild dizziness a few days ago that was not consistent with prior vertigo.  denies any vision changes or headache issues, loss of balance, no falls.    She denies any new or unexplained bone aches or pains- denies any sternal bone pain.    She denies any skin lesions or masses, oral sores lesions or infections- she denies any formation of new chest wall masses. She did experience some sun sensitivity once starting the kisqali. She denies any issues with itching in the chest wall.       Review of systems  ROS 14 points performed, See HPI for exceptions      Past Medical History:  has a past medical history of Anxiety disorder, unspecified (2015), Encounter for other preprocedural examination (10/28/2016), Encounter for other screening for malignant neoplasm of breast (10/03/2016), Other abnormal and inconclusive findings on diagnostic imaging of breast (10/20/2016), Pelvic and perineal pain (10/03/2016), Personal history of malignant neoplasm of breast (2016), Personal history of other diseases of the circulatory system (2015), and Unspecified lump in the left breast, unspecified quadrant (10/20/2016).  Surgical History:   has a past surgical history that includes Dilation and curettage of uterus (2014); Hysterectomy (2014);  section, classic  (2014); Other surgical history (2014); Other surgical history (2016); Ogema lymph node biopsy (2016); and Mastectomy (Left).  Social History:   reports that she has never smoked. She has never used smokeless tobacco. She reports that she does not drink alcohol and does not use drugs.She is , lives in a house house with family, completed 10th grade, currently employed as a    Family History:  No family history on file.  Family Oncology History:  Cancer-related family history is not on file.    Breast Cancer Risk Factors: , Had her menarche at age 14 years, 1st pregnancy at age 27 years, menopause in  , never used BCP or HRT       OBJECTIVE    VS / Pain:  /83 (BP Location: Right arm, Patient Position: Sitting)   Pulse 98   Temp 36.3 °C (97.3 °F) (Temporal)   Resp 18   Wt 85.1 kg (187 lb 9.8 oz)   SpO2 93%   BMI 29.97 kg/m²   BSA: 2 meters squared   Pain Scale: 0    Performance Status:  The ECOG performance scale today is ECO- Fully active, able to carry on all pre-disease performance w/o restriction.    Physical Exam  Constitutional: Well developed, awake/alert/oriented x4, no distress, alert and cooperative  EYES: Sclera clear  ENMT: mucous membranes moist, no apparent injury, no lesions seen  Head/Neck: Neck supple, no apparent injury, thyroid without mass or tenderness, No JVD, trachea midline, no bruits  Respiratory / Thoracic: Patent airways, clear to all lobes, normal breath sounds with good chest expansion, thorax symmetric.  Cardiovascular: Regular, rate and rhythm, no murmurs, 2+ equal pulses of the extremities, normal auscultated S 1and S 2  GI: Nondistended, soft, non-tender, no rebound tenderness or guarding, no masses palpable, no organomegaly, +BS, no bruits  Musculoskeletal: ROM intact, no joint swelling, normal strength, no spinal tenderness  Extremities: normal extremities, no cyanosis edema, contusions or wounds, no  clubbing  Neurological: alert and oriented x4, intact senses, motor, response and reflexes, normal strength  Breast: S/p Left mastectomy without reconstruction. No palpable masses or lesions in right breast or left chest wall. Baseline hypopigmentation across the left chest wall.   Lymphatic: No cervical, supraclavicular, infraclavicular or axillary lymphadenopathy.  Psychological: Appropriate and talkative mood and behavior  Skin: Warm and dry, no lesions, no rashes, no jaundice      Diagnostic Results   === 05/17/24 ===    CT ABDOMEN PELVIS W IV CONTRAST    - Impression -  Cholelithiasis.    Small hiatal hernia.    No obvious metastatic disease.    MACRO:  none    Signed by: Aurea Freire 5/17/2024 1:03 PM  Dictation workstation:   [id number]     Narrative & Impression   Interpreted By:  Gt Bhatia,  Patric Valencia   STUDY:  NM BONE WHOLE BODY;  5/17/2024 2:43 pm      INDICATION:  Signs/Symptoms:Hx breast cancer - Abnormal bone concern on CT Chest.      COMPARISON:  CT abdomen and pelvis, 05/17/2024      ACCESSION NUMBER(S):  [id number]      ORDERING CLINICIAN:  WENDY ORDONEZ      TECHNIQUE:  DIVISION OF NUCLEAR MEDICINE  BONE SCAN, WHOLE BODY plus REGIONAL VIEWS      The patient received an intravenous dose of  25 mCi of Tc-99m MDP.  Anterior and posterior images of the skeleton from skull vertex to  feet were then acquired.  Additional regional skeletal images were  also obtained.      FINDINGS:  There is focal radiotracer accumulation along the left lateral aspect  of the sternum at the level of the sternomanubrial joint.      Expected, excreted activity is noted in the kidneys and bladder.      Increased radiotracer uptake is seen in the thoracic spine and  bilateral mid feet, most consistent with an arthritic pattern.      IMPRESSION:  1. Focus of radiotracer accumulation along the left lateral aspect of  the sternum at the level of the sternomanubrial joint, compatible  with erosion of the sternum  from the known adjacent left chest wall  soft tissue mass.  2. Degenerative changes of the axial and appendicular skeleton as  above.              BI US breast limited left 05/13/2024    Narrative  Interpreted By:  Sherie Olson,  STUDY:  BI US BREAST LIMITED LEFT;  5/13/2024 9:50 am    ACCESSION NUMBER(S):  [id number]    ORDERING CLINICIAN:  WENDY ORDONEZ    INDICATION:  Patient presents for evaluation of chest wall masses and an  indeterminate left axillary lymph node. Patient is status post left  mastectomy.    COMPARISON:  CT study dated 05/10/2024    FINDINGS:  Targeted ultrasound was performed. In the left chest wall at the 10  o'clock position there is a heterogeneous mass measuring 4.8 x 1.9 x  2.7 cm. This is intermediate with elastography and there is internal  vascularity. This corresponds to the CT finding. In the left medial  infraclavicular region there is an oval circumscribed mass measuring  1.3 x 0.8 x 1.3 cm. There is internal vascularity and this is hard  with elastography. This corresponds to the additional CT finding.    In the left axilla there is an abnormal lymph node. The lymph node  demonstrates cortical thickening measuring 0.8 cm. 3 additional  unremarkable lymph nodes are present.    Impression  1. Suspicious left chest wall mass at the 10 o'clock position. An  ultrasound-guided biopsy is recommended.  2. Suspicious left infraclavicular mass and left axillary lymph node.  If management will change a biopsy could be performed.      The biopsy will be performed on the study date. This was discussed  with the patient at the time of her visit with Dr. Olson.    BI-RADS CATEGORY:    BI-RADS CATEGORY:  5 Highly Suggestive of Malignancy.  Recommendation:  Surgical Consultation and Biopsy.  Recommended Date:  Immediate.  Laterality:  Left.  For any future breast imaging appointments, please call [phone]).    MACRO:  Critical Finding:  See findings. Notification was  initiated on  5/13/2024 at 10:22 am by  Sherie Olson.  (**-YCF-**) Instructions:  Surgical Consultation and Imaging Guided Biopsy.      Signed by: Sherie Olson 5/13/2024 10:27 AM  Dictation workstation:   [id number]     === 09/02/22 ===    BONE DENSITY    - Impression -  The BMD measured at AP Spine L1-L4 is 1.058 g/cm2 with a T-score of  -1.0.  Bone density is up to 10% below young normal.  This patient is  considered normal according to World Health Organization (WHO)  criteria.    With a Z-score of -0.5, this patient's BMD is considered within  normal limits relative to their age.  Even so, they may be considered  osteopenic or osteoporotic, which is normal for this age.  .  Bone mineral density in the lumbar spine may be falsely elevated  secondary to discogenic degenerative disease and degenerative facet  sclerosis.    Follow-up DEXA and treatment is recommended as clinically indicated.  .  All images and detailed analysis are available on the  Radiology  PACS.*    *For patients with comparison exams obtained from Hologic DEXA prior  to December 2006, measurements presented in this report have been  modified to reflect more accurate trend analysis when compared to  current data obtained on the [facility] DEXA.      LABORATORY/PATHOLOGY DATA  Pending Labs (CBC, CMP and Ca 27.29) and EKG 6/28/24    Hospital Outpatient Visit on 05/22/2024   Component Date Value Ref Range Status    Ventricular Rate 05/22/2024 76  BPM Final    Atrial Rate 05/22/2024 76  BPM Final    HI Interval 05/22/2024 162  ms Final    QRS Duration 05/22/2024 88  ms Final    QT Interval 05/22/2024 404  ms Final    QTC Calculation(Bazett) 05/22/2024 454  ms Final    P Axis 05/22/2024 71  degrees Final    R Axis 05/22/2024 70  degrees Final    T Axis 05/22/2024 65  degrees Final    QRS Count 05/22/2024 13  beats Final    Q Onset 05/22/2024 219  ms Final    P Onset 05/22/2024 138  ms Final    P Offset 05/22/2024 195  ms Final    T Offset  05/22/2024 421  ms Final    QTC Fredericia 05/22/2024 436  ms Final   Lab on 05/22/2024   Component Date Value Ref Range Status    CA 27.29 05/22/2024 44.3 (H)  0.0 - 38.6 U/mL Final    WBC 05/22/2024 4.1 (L)  4.4 - 11.3 x10*3/uL Final    nRBC 05/22/2024 0.0  0.0 - 0.0 /100 WBCs Final    RBC 05/22/2024 4.39  4.00 - 5.20 x10*6/uL Final    Hemoglobin 05/22/2024 12.2  12.0 - 16.0 g/dL Final    Hematocrit 05/22/2024 38.0  36.0 - 46.0 % Final    MCV 05/22/2024 87  80 - 100 fL Final    MCH 05/22/2024 27.8  26.0 - 34.0 pg Final    MCHC 05/22/2024 32.1  32.0 - 36.0 g/dL Final    RDW 05/22/2024 14.3  11.5 - 14.5 % Final    Platelets 05/22/2024 319  150 - 450 x10*3/uL Final    Neutrophils % 05/22/2024 67.4  40.0 - 80.0 % Final    Immature Granulocytes %, Automated 05/22/2024 0.5  0.0 - 0.9 % Final    Lymphocytes % 05/22/2024 22.6  13.0 - 44.0 % Final    Monocytes % 05/22/2024 8.8  2.0 - 10.0 % Final    Eosinophils % 05/22/2024 0.5  0.0 - 6.0 % Final    Basophils % 05/22/2024 0.2  0.0 - 2.0 % Final    Neutrophils Absolute 05/22/2024 2.77  1.20 - 7.70 x10*3/uL Final    Immature Granulocytes Absolute, Au* 05/22/2024 0.02  0.00 - 0.70 x10*3/uL Final    Lymphocytes Absolute 05/22/2024 0.93 (L)  1.20 - 4.80 x10*3/uL Final    Monocytes Absolute 05/22/2024 0.36  0.10 - 1.00 x10*3/uL Final    Eosinophils Absolute 05/22/2024 0.02  0.00 - 0.70 x10*3/uL Final    Basophils Absolute 05/22/2024 0.01  0.00 - 0.10 x10*3/uL Final    Glucose 05/22/2024 98  74 - 99 mg/dL Final    Sodium 05/22/2024 138  136 - 145 mmol/L Final    Potassium 05/22/2024 3.6  3.5 - 5.3 mmol/L Final    Chloride 05/22/2024 98  98 - 107 mmol/L Final    Bicarbonate 05/22/2024 29  21 - 32 mmol/L Final    Anion Gap 05/22/2024 15  10 - 20 mmol/L Final    Urea Nitrogen 05/22/2024 17  6 - 23 mg/dL Final    Creatinine 05/22/2024 0.97  0.50 - 1.05 mg/dL Final    eGFR 05/22/2024 66  >60 mL/min/1.73m*2 Final    Calcium 05/22/2024 9.9  8.6 - 10.3 mg/dL Final    Albumin 05/22/2024  4.6  3.4 - 5.0 g/dL Final    Alkaline Phosphatase 2024 139 (H)  33 - 136 U/L Final    Total Protein 2024 7.9  6.4 - 8.2 g/dL Final    AST 2024 17  9 - 39 U/L Final    Bilirubin, Total 2024 0.5  0.0 - 1.2 mg/dL Final    ALT 2024 14  7 - 45 U/L Final      Lab Results   Component Value Date    LABCA2 44.3 (H) 2024       FINAL DIAGNOSIS   A. Left chest wall mass, 10:00, ultrasound guided core needle biopsy:  -- Invasive ductal carcinoma, grade 3 involving fibrous tissue and skeletal muscle, see note.     Note:  In this limited sample, the invasive carcinoma measures up to 12 mm in greatest dimension.  ER, VT and HER2 will be reported in an addendum.      Electronically signed by Gabby Mcleod MD on 2024 at [address] [place] [address]   By the signature on this report, the individual or group listed as making the Final Interpretation/Diagnosis certifies that they have reviewed this case.    Addendum   Surgical/Block Number:        [id number] A1  Specimen Site:         Left chest wall mass  Specimen Type:       core needle biopsy        Estrogen Receptor (clone SP1):                     Positive                                                                          Percentage with nuclear stainin-95%                                                                          Intensity of staining: Moderate to Strong     Progesterone Receptor (clone SP2): Positive                                                                         Percentage with nuclear stainin-40%                                                                          Intensity of staining: Moderate to Strong     HER2 (clone 4B5):                                                                                                                    Equivocal (2+).  JESSICA is pending and will be reported in addendum.                                                                            Comment:         Addendum  electronically signed by Gabby Mcleod MD on 5/16/2024 at 1014   Addendum   HER2 DUAL JESSICA FOR DETECTION OF HER2 GENE AMPLIFICATION  HER2 Dual JESSICA DNA Probe Cocktail Assay from [employer] ([employer])    Results are expressed as the averaged ratio HER2/chromosome 17 signals in 20 nuclei.        Source of Specimen: Left chest wall mass biopsy  Paraffin Block: [id number]  Duration of fixation:  unless otherwise noted, 6-72 hours fixation     TEST RESULTS:    Number of tumor cells counted:  20  Number of observers: 1  Average number of HER2 signals/nucleus:  2.6  Average number of CEP 17 signals/nucleus: 2.1  Ratio of average HER2/CEP 17:  1.2     INTERPRETATION:       Negative (Not amplified)       NGS testing     DISEASE ASSOCIATED GENOMIC FINDINGS:   PIK3CA p.V3866T ([id number] c.3140A>G)  TP53 p.R248G ([id number] c.742C>G)     INTERPRETATION AND POTENTIAL THERAPEUTIC OPTIONS:  PIK3CA p.N6238G  FDA RECOMMENDATIONS (Advanced HR+Her2- Breast Cancer):  alpelisib + fulvestrant  capivasertib + fulvestrant        DISEASE RELEVANT ALTERATIONS NOT DETECTED:   Negative for AKT1 activating mutation.  Negative for BRAF V600E.  Negative for ESR1 activating mutation.  Negative for PTEN loss of function mutation.        VARIANTS OF UNCERTAIN SIGNIFICANCE:   MET p.O6203A ([id number] c.3212T>G)      IMPRESSION/PLAN    Recurrent oligometastatic ER+/RI+/HER2 2+ breast cancer with chest wall/sternal mass and left axillary and intrathoracic nodes.   Previously seen and evaluated with Dr Keshav Camarena for left breast cancer recurrence in the chest wall. Was initiated on exemestane and ribociclib. Per protocol EKG monitoring and labs.    NGS testing was completed showing PIK3CA.     Last DEXA testing (2022) with T-score -1.0. Planned repeat DEXA 9/2024-Per Dr. Camarena if osteopenia, will consider adding q6 month zometa. No active dental issues. We have reviewed this again today.    Has met with radiation oncology (   Carolina Damon) to discuss the role of radiation in the future if she has a good response to AI-CDK4/6 therapy.    Grade 1 Fatigue Stable at this time, will continue to monitor. Labs pending   Grade 1 nausea Stable- prn antinausea sent to [facility] pharmacy     [facility] 8/7/24, we have reviewed planned restaging scans after 3 cycles of therapy. She is agreeable to this plan.       At least 35 minutes of direct consultation was spent with the patient today reviewing her cancer care plan, educating and answering questions regarding ongoing follow up, greater than 50% in counseling and coordination of care            We discussed the clinical significance of diagnosis, goals of care and treatment plan in detail.       -------------------------------------------------------------------------------------------------------------------------------  Alize Hernandez MSN, APRN, FNP-C  [facility]  Division of Medical Oncology- Breast   Collaborating Physician Dr. Keshav Camarena   [facility] [place] [address]  Phone: [phone]  Fax: [phone]  Available via Secure Chat    Confidential Peer Review Document-  Privilege  Privileged Pursuant to [place] Revised Code Section 2305.24, .25, .251 & .252

## 2024-07-01 ENCOUNTER — ONCOLOGY MEDICATION OUTREACH (OUTPATIENT)
Dept: HEMATOLOGY/ONCOLOGY | Facility: CLINIC | Age: 64
End: 2024-07-01
Payer: COMMERCIAL

## 2024-07-05 LAB
ATRIAL RATE: 90 BPM
P AXIS: 43 DEGREES
P OFFSET: 189 MS
P ONSET: 145 MS
PR INTERVAL: 150 MS
Q ONSET: 220 MS
QRS COUNT: 15 BEATS
QRS DURATION: 76 MS
QT INTERVAL: 378 MS
QTC CALCULATION(BAZETT): 462 MS
QTC FREDERICIA: 432 MS
R AXIS: 24 DEGREES
T AXIS: 50 DEGREES
T OFFSET: 409 MS
VENTRICULAR RATE: 90 BPM

## 2024-07-10 ENCOUNTER — LAB (OUTPATIENT)
Dept: LAB | Facility: LAB | Age: 64
End: 2024-07-10
Payer: COMMERCIAL

## 2024-07-10 ENCOUNTER — HOSPITAL ENCOUNTER (OUTPATIENT)
Dept: CARDIOLOGY | Facility: HOSPITAL | Age: 64
Discharge: HOME | End: 2024-07-10
Payer: COMMERCIAL

## 2024-07-10 ENCOUNTER — ONCOLOGY MEDICATION OUTREACH (OUTPATIENT)
Dept: HEMATOLOGY/ONCOLOGY | Facility: CLINIC | Age: 64
End: 2024-07-10

## 2024-07-10 DIAGNOSIS — Z17.0 MALIGNANT NEOPLASM OF LEFT BREAST IN FEMALE, ESTROGEN RECEPTOR POSITIVE, UNSPECIFIED SITE OF BREAST (MULTI): ICD-10-CM

## 2024-07-10 DIAGNOSIS — Z51.11 ENCOUNTER FOR ANTINEOPLASTIC CHEMOTHERAPY: ICD-10-CM

## 2024-07-10 DIAGNOSIS — Z90.12 HISTORY OF LEFT MASTECTOMY: ICD-10-CM

## 2024-07-10 DIAGNOSIS — C50.912 MALIGNANT NEOPLASM OF LEFT BREAST IN FEMALE, ESTROGEN RECEPTOR POSITIVE, UNSPECIFIED SITE OF BREAST (MULTI): ICD-10-CM

## 2024-07-10 DIAGNOSIS — C50.912 CHEST WALL RECURRENCE OF BREAST CANCER, LEFT (MULTI): ICD-10-CM

## 2024-07-10 DIAGNOSIS — C79.89 CHEST WALL RECURRENCE OF BREAST CANCER, LEFT (MULTI): ICD-10-CM

## 2024-07-10 DIAGNOSIS — Z79.811 AROMATASE INHIBITOR USE: ICD-10-CM

## 2024-07-10 LAB
ALBUMIN SERPL BCP-MCNC: 4.2 G/DL (ref 3.4–5)
ALP SERPL-CCNC: 119 U/L (ref 33–136)
ALT SERPL W P-5'-P-CCNC: 15 U/L (ref 7–45)
ANION GAP SERPL CALC-SCNC: 13 MMOL/L (ref 10–20)
AST SERPL W P-5'-P-CCNC: 15 U/L (ref 9–39)
ATRIAL RATE: 74 BPM
BASOPHILS # BLD MANUAL: 0.02 X10*3/UL (ref 0–0.1)
BASOPHILS NFR BLD MANUAL: 1.4 %
BILIRUB SERPL-MCNC: 0.4 MG/DL (ref 0–1.2)
BUN SERPL-MCNC: 14 MG/DL (ref 6–23)
CALCIUM SERPL-MCNC: 9.3 MG/DL (ref 8.6–10.3)
CANCER AG27-29 SERPL-ACNC: 36.3 U/ML (ref 0–38.6)
CHLORIDE SERPL-SCNC: 100 MMOL/L (ref 98–107)
CO2 SERPL-SCNC: 30 MMOL/L (ref 21–32)
CREAT SERPL-MCNC: 1.07 MG/DL (ref 0.5–1.05)
EGFRCR SERPLBLD CKD-EPI 2021: 58 ML/MIN/1.73M*2
EOSINOPHIL # BLD MANUAL: 0.02 X10*3/UL (ref 0–0.7)
EOSINOPHIL NFR BLD MANUAL: 1.4 %
ERYTHROCYTE [DISTWIDTH] IN BLOOD BY AUTOMATED COUNT: 19.2 % (ref 11.5–14.5)
GLUCOSE SERPL-MCNC: 102 MG/DL (ref 74–99)
HCT VFR BLD AUTO: 35.4 % (ref 36–46)
HGB BLD-MCNC: 11.5 G/DL (ref 12–16)
IMM GRANULOCYTES # BLD AUTO: 0.01 X10*3/UL (ref 0–0.7)
IMM GRANULOCYTES NFR BLD AUTO: 0.6 % (ref 0–0.9)
LYMPHOCYTES # BLD MANUAL: 0.51 X10*3/UL (ref 1.2–4.8)
LYMPHOCYTES NFR BLD MANUAL: 30 %
MCH RBC QN AUTO: 29.3 PG (ref 26–34)
MCHC RBC AUTO-ENTMCNC: 32.5 G/DL (ref 32–36)
MCV RBC AUTO: 90 FL (ref 80–100)
MONOCYTES # BLD MANUAL: 0.05 X10*3/UL (ref 0.1–1)
MONOCYTES NFR BLD MANUAL: 2.9 %
MYELOCYTES # BLD MANUAL: 0.02 X10*3/UL
MYELOCYTES NFR BLD MANUAL: 1.4 %
NEUTS SEG # BLD MANUAL: 0.95 X10*3/UL (ref 1.2–7)
NEUTS SEG NFR BLD MANUAL: 55.7 %
NRBC BLD-RTO: 0 /100 WBCS (ref 0–0)
OVALOCYTES BLD QL SMEAR: ABNORMAL
P AXIS: 30 DEGREES
P OFFSET: 192 MS
P ONSET: 150 MS
PLATELET # BLD AUTO: 215 X10*3/UL (ref 150–450)
POTASSIUM SERPL-SCNC: 4.5 MMOL/L (ref 3.5–5.3)
PR INTERVAL: 140 MS
PROT SERPL-MCNC: 7 G/DL (ref 6.4–8.2)
Q ONSET: 220 MS
QRS COUNT: 12 BEATS
QRS DURATION: 84 MS
QT INTERVAL: 408 MS
QTC CALCULATION(BAZETT): 452 MS
QTC FREDERICIA: 437 MS
R AXIS: 30 DEGREES
RBC # BLD AUTO: 3.92 X10*6/UL (ref 4–5.2)
RBC MORPH BLD: ABNORMAL
SODIUM SERPL-SCNC: 138 MMOL/L (ref 136–145)
T AXIS: 44 DEGREES
T OFFSET: 424 MS
TOTAL CELLS COUNTED BLD: 70
VARIANT LYMPHS # BLD MANUAL: 0.12 X10*3/UL (ref 0–0.5)
VARIANT LYMPHS NFR BLD: 7.1 %
VENTRICULAR RATE: 74 BPM
WBC # BLD AUTO: 1.7 X10*3/UL (ref 4.4–11.3)

## 2024-07-10 PROCEDURE — 85027 COMPLETE CBC AUTOMATED: CPT

## 2024-07-10 PROCEDURE — 85007 BL SMEAR W/DIFF WBC COUNT: CPT

## 2024-07-10 PROCEDURE — 80053 COMPREHEN METABOLIC PANEL: CPT

## 2024-07-10 PROCEDURE — 93005 ELECTROCARDIOGRAM TRACING: CPT

## 2024-07-10 PROCEDURE — 86300 IMMUNOASSAY TUMOR CA 15-3: CPT

## 2024-08-07 ENCOUNTER — ONCOLOGY MEDICATION OUTREACH (OUTPATIENT)
Dept: HEMATOLOGY/ONCOLOGY | Facility: CLINIC | Age: 64
End: 2024-08-07
Payer: COMMERCIAL

## 2024-08-07 ENCOUNTER — TELEPHONE (OUTPATIENT)
Dept: HEMATOLOGY/ONCOLOGY | Facility: HOSPITAL | Age: 64
End: 2024-08-07
Payer: COMMERCIAL

## 2024-08-07 ENCOUNTER — OFFICE VISIT (OUTPATIENT)
Dept: HEMATOLOGY/ONCOLOGY | Facility: CLINIC | Age: 64
End: 2024-08-07
Payer: COMMERCIAL

## 2024-08-07 ENCOUNTER — APPOINTMENT (OUTPATIENT)
Dept: RADIOLOGY | Facility: CLINIC | Age: 64
End: 2024-08-07
Payer: COMMERCIAL

## 2024-08-07 VITALS
WEIGHT: 191.69 LBS | TEMPERATURE: 98.2 F | BODY MASS INDEX: 30.62 KG/M2 | DIASTOLIC BLOOD PRESSURE: 91 MMHG | SYSTOLIC BLOOD PRESSURE: 150 MMHG | HEART RATE: 91 BPM | OXYGEN SATURATION: 96 % | RESPIRATION RATE: 18 BRPM

## 2024-08-07 DIAGNOSIS — C50.912 CHEST WALL RECURRENCE OF BREAST CANCER, LEFT (MULTI): ICD-10-CM

## 2024-08-07 DIAGNOSIS — Z90.12 HISTORY OF LEFT MASTECTOMY: ICD-10-CM

## 2024-08-07 DIAGNOSIS — C79.89 CHEST WALL RECURRENCE OF BREAST CANCER, LEFT (MULTI): ICD-10-CM

## 2024-08-07 DIAGNOSIS — Z79.811 AROMATASE INHIBITOR USE: ICD-10-CM

## 2024-08-07 DIAGNOSIS — Z51.11 ENCOUNTER FOR ANTINEOPLASTIC CHEMOTHERAPY: ICD-10-CM

## 2024-08-07 DIAGNOSIS — C50.912 MALIGNANT NEOPLASM OF LEFT BREAST IN FEMALE, ESTROGEN RECEPTOR POSITIVE, UNSPECIFIED SITE OF BREAST (MULTI): ICD-10-CM

## 2024-08-07 DIAGNOSIS — Z17.0 MALIGNANT NEOPLASM OF LEFT BREAST IN FEMALE, ESTROGEN RECEPTOR POSITIVE, UNSPECIFIED SITE OF BREAST (MULTI): ICD-10-CM

## 2024-08-07 PROCEDURE — 99215 OFFICE O/P EST HI 40 MIN: CPT | Performed by: INTERNAL MEDICINE

## 2024-08-07 ASSESSMENT — PATIENT HEALTH QUESTIONNAIRE - PHQ9
1. LITTLE INTEREST OR PLEASURE IN DOING THINGS: NOT AT ALL
SUM OF ALL RESPONSES TO PHQ9 QUESTIONS 1 & 2: 0
2. FEELING DOWN, DEPRESSED OR HOPELESS: NOT AT ALL

## 2024-08-07 ASSESSMENT — PAIN SCALES - GENERAL: PAINLEVEL: 0-NO PAIN

## 2024-08-07 NOTE — TELEPHONE ENCOUNTER
Patient called to clarify, Radiology scheduling called her to have CT scan done today.  Per her visit  with Dr. Camarena,  requested CT scan and NM bone scan in 8 weeks with follow-up in 9 weeks. She wanted to know if it changed to today?  Clarified with Team  the current instructions  for 8 weeks and instructed her to change today's Ct scan appt. to 8 weeks out, in early October. Provider patient Radiology scheduling number, she verbalized back understanding.

## 2024-08-07 NOTE — PATIENT INSTRUCTIONS
Please call us at [phone] option 5 then option 2 with any questions or concerns Continue exemestane/ribociclib  CT scan and bone scan in about 8 wks and bloodwork  I will see you back in about 9 wks

## 2024-08-09 ASSESSMENT — ENCOUNTER SYMPTOMS
APPETITE CHANGE: 0
DYSURIA: 0
WHEEZING: 0
BRUISES/BLEEDS EASILY: 0
EXTREMITY WEAKNESS: 0
HEMOPTYSIS: 0
BACK PAIN: 0
ARTHRALGIAS: 0
HEMATURIA: 0
SEIZURES: 0
DEPRESSION: 0
DIZZINESS: 0
LEG SWELLING: 0
FATIGUE: 1
NERVOUS/ANXIOUS: 1
HOT FLASHES: 0
CONSTIPATION: 0
FEVER: 0
ABDOMINAL DISTENTION: 0
ABDOMINAL PAIN: 0
CHEST TIGHTNESS: 0
FREQUENCY: 0
DIFFICULTY URINATING: 0
ADENOPATHY: 0
EYE PROBLEMS: 0
SHORTNESS OF BREATH: 0
PALPITATIONS: 0
CARDIOVASCULAR NEGATIVE: 1
COUGH: 0
SCLERAL ICTERUS: 0
CHILLS: 0
CONFUSION: 0
BLOOD IN STOOL: 0
NAUSEA: 0
NUMBNESS: 0
HEADACHES: 0
EYES NEGATIVE: 1
MYALGIAS: 0
SLEEP DISTURBANCE: 0
DIARRHEA: 0
RESPIRATORY NEGATIVE: 1
DIAPHORESIS: 0

## 2024-08-09 NOTE — PROGRESS NOTES
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    ONCOLOGIC HISTORY  Treatment Synopsis:    History of left-sided invasive ductal carcinoma, stage  IIA (T2 N0 M0). The patient's breast cancer was diagnosed on October 20, 2016, and is estrogen receptor positive at 95%, progesterone receptor positive at 90%, and HER-2/renae 2+ by IHC and not amplified  by FISH at 1.3.      In addition patient has an oncotype DX recurrence score of 31 translating to a 10-year distant recurrence rate of 21%.      Patient has completed a left mastectomy without reconstruction with SLNB on 11/11/2016. Pathology showed a 2.2cm mass, grade 3, with 0/7 LNs involved.     10/31/2016 genetic testing negative     4/2017 Initiated on Anastrazole     10/2018 changed to letrozole for intolerable side effects with anastrazole.     4/2022 Completed 5 year course antiestrogen therapy               Treatment History:       2016-11-11: Cancer Related Surgery:      Completed left mastectomy  2016-12-28: Chemotherapy:        Received cycle # of Taxotere and Cyclophosphamide  2017-3-1: Chemotherapy:            Completed 4 cycles of TC from 12/18/16 through to 03/01/2017 2017-4-12: New Treatment Plan:              Started arimidex  2018-10-26: New Treatment Plan:            Letrozole 2.5mg po daily started. Arimidex discontinued due to arthralgia.     CURRENT THERAPY  Exemestane/ribociclib since July 2024    HISTORY OF PRESENT ILLNESS    Christine Malik is a 64 y.o. woman here for follow-up. She was recently diagnosed with oligometastatic ER+ breast cancer. She had a recent left chest wall mass which revealed recurrent breast cancer grade 3 ER+ 90%/KY+ 30%/HER2 2+ but FISH negative. Had been on letrozole for about 5 yrs completed 4/2022. Tolerating treatment well. No issues.            Review of Systems    Constitutional:  Positive for fatigue. Negative for appetite change, chills, diaphoresis and fever.   HENT:  Negative.  Negative for hearing loss and lump/mass.    Eyes: Negative.  Negative for eye problems and icterus.   Respiratory: Negative.  Negative for chest tightness, cough, hemoptysis, shortness of breath and wheezing.    Cardiovascular: Negative.  Negative for chest pain, leg swelling and palpitations.   Gastrointestinal:  Negative for abdominal distention, abdominal pain, blood in stool, constipation, diarrhea and nausea.   Endocrine: Negative for hot flashes.   Genitourinary:  Negative for bladder incontinence, difficulty urinating, dyspareunia, dysuria, frequency and hematuria.    Musculoskeletal:  Negative for arthralgias, back pain, gait problem and myalgias.   Neurological:  Negative for dizziness, extremity weakness, gait problem, headaches, numbness and seizures.   Hematological:  Negative for adenopathy. Does not bruise/bleed easily.   Psychiatric/Behavioral:  Negative for confusion, depression and sleep disturbance. The patient is nervous/anxious.    All other systems reviewed and are negative.        Past Medical History:  has a past medical history of Anxiety disorder, unspecified (2015), Encounter for other preprocedural examination (10/28/2016), Encounter for other screening for malignant neoplasm of breast (10/03/2016), Other abnormal and inconclusive findings on diagnostic imaging of breast (10/20/2016), Pelvic and perineal pain (10/03/2016), Personal history of malignant neoplasm of breast (2016), Personal history of other diseases of the circulatory system (2015), and Unspecified lump in the left breast, unspecified quadrant (10/20/2016).  Surgical History:   has a past surgical history that includes Dilation and curettage of uterus (2014); Hysterectomy (2014);  section, classic (2014); Other surgical history (2014); Other surgical  history (2016); [place] lymph node biopsy (2016); and Mastectomy (Left).  Social History:   reports that she has never smoked. She has never used smokeless tobacco. She reports that she does not drink alcohol and does not use drugs.  Family History:  No family history on file.  Family Oncology History:  Cancer-related family history is not on file.      OBJECTIVE    VS / Pain:  BP (!) 150/91   Pulse 91   Temp 36.8 °C (98.2 °F)   Resp 18   Wt 87 kg (191 lb 11 oz)   SpO2 96%   BMI 30.62 kg/m²   BSA: 2.02 meters squared   Pain Scale: 0    Performance Status:  The ECOG performance scale today is ECO- Fully active, able to carry on all pre-disease performance w/o restriction.    Physical Exam  Constitutional:       General: She is not in acute distress.     Appearance: She is not ill-appearing, toxic-appearing or diaphoretic.   HENT:      Nose: No congestion or rhinorrhea.      Mouth/Throat:      Pharynx: No posterior oropharyngeal erythema.   Cardiovascular:      Rate and Rhythm: Normal rate and regular rhythm.      Pulses: Normal pulses.      Heart sounds: Normal heart sounds. No murmur heard.     No gallop.   Pulmonary:      Breath sounds: Normal breath sounds.   Abdominal:      General: There is no distension.      Palpations: There is no mass.      Tenderness: There is no abdominal tenderness.   Musculoskeletal:         General: No swelling.      Cervical back: No rigidity.      Right lower leg: No edema.      Left lower leg: No edema.   Lymphadenopathy:      Cervical: No cervical adenopathy.   Skin:     General: Skin is warm.      Coloration: Skin is not cyanotic.      Findings: No bruising, ecchymosis or erythema.   Neurological:      General: No focal deficit present.      Mental Status: She is oriented to person, place, and time.      Cranial Nerves: Cranial nerves 2-12 are intact.      Motor: Motor function is intact.   Psychiatric:         Attention and Perception: Attention and  perception normal.         Mood and Affect: Mood and affect normal.         Behavior: Behavior normal.         Thought Content: Thought content normal.         Judgment: Judgment normal.           Diagnostic Results   === 05/17/24 ===    CT ABDOMEN PELVIS W IV CONTRAST    - Impression -  Cholelithiasis.    Small hiatal hernia.    No obvious metastatic disease.    MACRO:  none    Signed by: Aurea Freire 5/17/2024 1:03 PM  Dictation workstation:   [id number]      BI US breast limited left 05/13/2024    Narrative  Interpreted By:  Sherie Olson,  STUDY:  BI US BREAST LIMITED LEFT;  5/13/2024 9:50 am    ACCESSION NUMBER(S):  [id number]    ORDERING CLINICIAN:  WENDY ORDONEZ    INDICATION:  Patient presents for evaluation of chest wall masses and an  indeterminate left axillary lymph node. Patient is status post left  mastectomy.    COMPARISON:  CT study dated 05/10/2024    FINDINGS:  Targeted ultrasound was performed. In the left chest wall at the 10  o'clock position there is a heterogeneous mass measuring 4.8 x 1.9 x  2.7 cm. This is intermediate with elastography and there is internal  vascularity. This corresponds to the CT finding. In the left medial  infraclavicular region there is an oval circumscribed mass measuring  1.3 x 0.8 x 1.3 cm. There is internal vascularity and this is hard  with elastography. This corresponds to the additional CT finding.    In the left axilla there is an abnormal lymph node. The lymph node  demonstrates cortical thickening measuring 0.8 cm. 3 additional  unremarkable lymph nodes are present.    Impression  1. Suspicious left chest wall mass at the 10 o'clock position. An  ultrasound-guided biopsy is recommended.  2. Suspicious left infraclavicular mass and left axillary lymph node.  If management will change a biopsy could be performed.      The biopsy will be performed on the study date. This was discussed  with the patient at the time of her visit with   [facility].    BI-RADS CATEGORY:    BI-RADS CATEGORY:  5 Highly Suggestive of Malignancy.  Recommendation:  Surgical Consultation and Biopsy.  Recommended Date:  Immediate.  Laterality:  Left.  For any future breast imaging appointments, please call [phone]  ([phone]).    MACRO:  Critical Finding:  See findings. Notification was initiated on  5/13/2024 at 10:22 am by  Sherie Olson.  (**-YCF-**) Instructions:  Surgical Consultation and Imaging Guided Biopsy.      Signed by: Sherie Olson 5/13/2024 10:27 AM  Dictation workstation:   [id number]     No images are attached to the encounter.    LABORATORY/PATHOLOGY DATA    Hospital Outpatient Visit on 07/10/2024   Component Date Value Ref Range Status    Ventricular Rate 07/10/2024 74  BPM Final    Atrial Rate 07/10/2024 74  BPM Final    LA Interval 07/10/2024 140  ms Final    QRS Duration 07/10/2024 84  ms Final    QT Interval 07/10/2024 408  ms Final    QTC Calculation(Bazett) 07/10/2024 452  ms Final    P Axis 07/10/2024 30  degrees Final    R Axis 07/10/2024 30  degrees Final    T Axis 07/10/2024 44  degrees Final    QRS Count 07/10/2024 12  beats Final    Q Onset 07/10/2024 220  ms Final    P Onset 07/10/2024 150  ms Final    P Offset 07/10/2024 192  ms Final    T Offset 07/10/2024 424  ms Final    QTC Fredericia 07/10/2024 437  ms Final   Lab on 07/10/2024   Component Date Value Ref Range Status    Glucose 07/10/2024 102 (H)  74 - 99 mg/dL Final    Sodium 07/10/2024 138  136 - 145 mmol/L Final    Potassium 07/10/2024 4.5  3.5 - 5.3 mmol/L Final    Chloride 07/10/2024 100  98 - 107 mmol/L Final    Bicarbonate 07/10/2024 30  21 - 32 mmol/L Final    Anion Gap 07/10/2024 13  10 - 20 mmol/L Final    Urea Nitrogen 07/10/2024 14  6 - 23 mg/dL Final    Creatinine 07/10/2024 1.07 (H)  0.50 - 1.05 mg/dL Final    eGFR 07/10/2024 58 (L)  >60 mL/min/1.73m*2 Final    Calcium 07/10/2024 9.3  8.6 - 10.3 mg/dL Final    Albumin 07/10/2024 4.2  3.4 - 5.0 g/dL Final    Alkaline  Phosphatase 07/10/2024 119  33 - 136 U/L Final    Total Protein 07/10/2024 7.0  6.4 - 8.2 g/dL Final    AST 07/10/2024 15  9 - 39 U/L Final    Bilirubin, Total 07/10/2024 0.4  0.0 - 1.2 mg/dL Final    ALT 07/10/2024 15  7 - 45 U/L Final    WBC 07/10/2024 1.7 (L)  4.4 - 11.3 x10*3/uL Final    nRBC 07/10/2024 0.0  0.0 - 0.0 /100 WBCs Final    RBC 07/10/2024 3.92 (L)  4.00 - 5.20 x10*6/uL Final    Hemoglobin 07/10/2024 11.5 (L)  12.0 - 16.0 g/dL Final    Hematocrit 07/10/2024 35.4 (L)  36.0 - 46.0 % Final    MCV 07/10/2024 90  80 - 100 fL Final    MCH 07/10/2024 29.3  26.0 - 34.0 pg Final    MCHC 07/10/2024 32.5  32.0 - 36.0 g/dL Final    RDW 07/10/2024 19.2 (H)  11.5 - 14.5 % Final    Platelets 07/10/2024 215  150 - 450 x10*3/uL Final    Immature Granulocytes %, Automated 07/10/2024 0.6  0.0 - 0.9 % Final    Immature Granulocytes Absolute, Au* 07/10/2024 0.01  0.00 - 0.70 x10*3/uL Final    CA 27.29 07/10/2024 36.3  0.0 - 38.6 U/mL Final    Neutrophils %, Manual 07/10/2024 55.7  40.0 - 80.0 % Final    Lymphocytes %, Manual 07/10/2024 30.0  13.0 - 44.0 % Final    Monocytes %, Manual 07/10/2024 2.9  2.0 - 10.0 % Final    Eosinophils %, Manual 07/10/2024 1.4  0.0 - 6.0 % Final    Basophils %, Manual 07/10/2024 1.4  0.0 - 2.0 % Final    Atypical Lymphocytes %, Manual 07/10/2024 7.1  0.0 - 2.0 % Final    Myelocytes %, Manual 07/10/2024 1.4  0.0 - 0.0 % Final    Seg Neutrophils Absolute, Manual 07/10/2024 0.95 (L)  1.20 - 7.00 x10*3/uL Final    Lymphocytes Absolute, Manual 07/10/2024 0.51 (L)  1.20 - 4.80 x10*3/uL Final    Monocytes Absolute, Manual 07/10/2024 0.05 (L)  0.10 - 1.00 x10*3/uL Final    Eosinophils Absolute, Manual 07/10/2024 0.02  0.00 - 0.70 x10*3/uL Final    Basophils Absolute, Manual 07/10/2024 0.02  0.00 - 0.10 x10*3/uL Final    Atypical Lymphs Absolute, Manual 07/10/2024 0.12  0.00 - 0.50 x10*3/uL Final    Myelocytes Absolute, Manual 07/10/2024 0.02  0.00 - 0.00 x10*3/uL Final    Total Cells Counted  07/10/2024 70   Final    RBC Morphology 07/10/2024 See Below   Final    Ovalocytes 07/10/2024 Few   Final   Hospital Outpatient Visit on 06/28/2024   Component Date Value Ref Range Status    Ventricular Rate 06/28/2024 90  BPM Final    Atrial Rate 06/28/2024 90  BPM Final    WA Interval 06/28/2024 150  ms Final    QRS Duration 06/28/2024 76  ms Final    QT Interval 06/28/2024 378  ms Final    QTC Calculation(Bazett) 06/28/2024 462  ms Final    P Axis 06/28/2024 43  degrees Final    R Axis 06/28/2024 24  degrees Final    T Axis 06/28/2024 50  degrees Final    QRS Count 06/28/2024 15  beats Final    Q Onset 06/28/2024 220  ms Final    P Onset 06/28/2024 145  ms Final    P Offset 06/28/2024 189  ms Final    T Offset 06/28/2024 409  ms Final    QTC Fredericia 06/28/2024 432  ms Final   Lab on 06/28/2024   Component Date Value Ref Range Status    WBC 06/28/2024 3.3 (L)  4.4 - 11.3 x10*3/uL Final    nRBC 06/28/2024 0.0  0.0 - 0.0 /100 WBCs Final    RBC 06/28/2024 3.85 (L)  4.00 - 5.20 x10*6/uL Final    Hemoglobin 06/28/2024 10.9 (L)  12.0 - 16.0 g/dL Final    Hematocrit 06/28/2024 34.3 (L)  36.0 - 46.0 % Final    MCV 06/28/2024 89  80 - 100 fL Final    MCH 06/28/2024 28.3  26.0 - 34.0 pg Final    MCHC 06/28/2024 31.8 (L)  32.0 - 36.0 g/dL Final    RDW 06/28/2024 15.9 (H)  11.5 - 14.5 % Final    Platelets 06/28/2024 254  150 - 450 x10*3/uL Final    Neutrophils % 06/28/2024 71.5  40.0 - 80.0 % Final    Immature Granulocytes %, Automated 06/28/2024 0.9  0.0 - 0.9 % Final    Lymphocytes % 06/28/2024 20.7  13.0 - 44.0 % Final    Monocytes % 06/28/2024 4.8  2.0 - 10.0 % Final    Eosinophils % 06/28/2024 1.5  0.0 - 6.0 % Final    Basophils % 06/28/2024 0.6  0.0 - 2.0 % Final    Neutrophils Absolute 06/28/2024 2.39  1.20 - 7.70 x10*3/uL Final    Immature Granulocytes Absolute, Au* 06/28/2024 0.03  0.00 - 0.70 x10*3/uL Final    Lymphocytes Absolute 06/28/2024 0.69 (L)  1.20 - 4.80 x10*3/uL Final    Monocytes Absolute 06/28/2024  0.16  0.10 - 1.00 x10*3/uL Final    Eosinophils Absolute 06/28/2024 0.05  0.00 - 0.70 x10*3/uL Final    Basophils Absolute 06/28/2024 0.02  0.00 - 0.10 x10*3/uL Final    Glucose 06/28/2024 123 (H)  74 - 99 mg/dL Final    Sodium 06/28/2024 139  136 - 145 mmol/L Final    Potassium 06/28/2024 4.0  3.5 - 5.3 mmol/L Final    Chloride 06/28/2024 100  98 - 107 mmol/L Final    Bicarbonate 06/28/2024 30  21 - 32 mmol/L Final    Anion Gap 06/28/2024 13  10 - 20 mmol/L Final    Urea Nitrogen 06/28/2024 21  6 - 23 mg/dL Final    Creatinine 06/28/2024 1.19 (H)  0.50 - 1.05 mg/dL Final    eGFR 06/28/2024 51 (L)  >60 mL/min/1.73m*2 Final    Calcium 06/28/2024 9.5  8.6 - 10.6 mg/dL Final    Albumin 06/28/2024 4.2  3.4 - 5.0 g/dL Final    Alkaline Phosphatase 06/28/2024 122  33 - 136 U/L Final    Total Protein 06/28/2024 7.4  6.4 - 8.2 g/dL Final    AST 06/28/2024 15  9 - 39 U/L Final    Bilirubin, Total 06/28/2024 0.3  0.0 - 1.2 mg/dL Final    ALT 06/28/2024 18  7 - 45 U/L Final    CA 27.29 06/28/2024 39.3 (H)  0.0 - 38.6 U/mL Final    RBC Morphology 06/28/2024 See Below   Final    Ovalocytes 06/28/2024 Few   Final      Lab Results   Component Value Date    LABCA2 36.3 07/10/2024    LABCA2 39.3 (H) 06/28/2024    LABCA2 44.3 (H) 05/22/2024       FINAL DIAGNOSIS   A. Left chest wall mass, 10:00, ultrasound guided core needle biopsy:  -- Invasive ductal carcinoma, grade 3 involving fibrous tissue and skeletal muscle, see note.     Note:  In this limited sample, the invasive carcinoma measures up to 12 mm in greatest dimension.  ER, [place] and HER2 will be reported in an addendum.      Electronically signed by Gabby Mcleod MD on 5/16/2024 at [address] [place] [address]   By the signature on this report, the individual or group listed as making the Final Interpretation/Diagnosis certifies that they have reviewed this case.    Addendum   Surgical/Block Number:        [id number]  Specimen Site:         Left chest wall mass  Specimen Type:        core needle biopsy        Estrogen Receptor (clone SP1):                     Positive                                                                          Percentage with nuclear stainin-95%                                                                          Intensity of staining: Moderate to Strong     Progesterone Receptor (clone SP2): Positive                                                                         Percentage with nuclear stainin-40%                                                                          Intensity of staining: Moderate to Strong     HER2 (clone [id number]):                                                                                                                    Equivocal (2+).  JESSICA is pending and will be reported in addendum.                                                                            Comment:         Addendum electronically signed by Gabby Mcleod MD on 2024 at 1014   Addendum   HER2 DUAL JESSICA FOR DETECTION OF HER2 GENE AMPLIFICATION  HER2 Dual JESSICA DNA Probe Cocktail Assay from [employer] ([employer])    Results are expressed as the averaged ratio HER2/chromosome 17 signals in 20 nuclei.        Source of Specimen: Left chest wall mass biopsy  Paraffin Block: [id number]  Duration of fixation:  unless otherwise noted, 6-72 hours fixation     TEST RESULTS:    Number of tumor cells counted:  20  Number of observers: 1  Average number of HER2 signals/nucleus:  2.6  Average number of CEP 17 signals/nucleus: 2.1  Ratio of average HER2/CEP 17:  1.2     INTERPRETATION:       Negative (Not amplified)         IMPRESSION/PLAN    Recurrent oligometastatic ER+/LA+/HER2 2+ breast cancer with chest wall/sternal mass and left axillary and intrathoracic nodes. Continue present therapy and repeat CT scan and bone scan in about 6-8 wks. Baseline DEXA scan scheduled for 9/3/24. If osteopenia, will consider adding q6 month zometa. No active  dental issues. RTC in about 5-6 wks        We discussed the clinical significance of diagnosis, goals of care and treatment plan in detail.     I personally spent over half of a total 40 minutes face to face with the patient in counseling and discussion and/or coordination of care as described above.         -------------------------------------------------------------------------------------------------------------------------------  Keshav Camarena MD  Director of Breast Cancer Medical Oncology Research Program   [facility]  Professor of Medicine  [facility] [address] [address]  [place] [address]  Phone: [phone]  [email or web address]

## 2024-08-29 ENCOUNTER — APPOINTMENT (OUTPATIENT)
Dept: SURGICAL ONCOLOGY | Facility: CLINIC | Age: 64
End: 2024-08-29
Payer: COMMERCIAL

## 2024-09-03 ENCOUNTER — HOSPITAL ENCOUNTER (OUTPATIENT)
Dept: RADIOLOGY | Facility: CLINIC | Age: 64
Discharge: HOME | End: 2024-09-03
Payer: COMMERCIAL

## 2024-09-03 DIAGNOSIS — C50.912 MALIGNANT NEOPLASM OF LEFT BREAST IN FEMALE, ESTROGEN RECEPTOR POSITIVE, UNSPECIFIED SITE OF BREAST (MULTI): ICD-10-CM

## 2024-09-03 DIAGNOSIS — Z17.0 MALIGNANT NEOPLASM OF LEFT BREAST IN FEMALE, ESTROGEN RECEPTOR POSITIVE, UNSPECIFIED SITE OF BREAST (MULTI): ICD-10-CM

## 2024-09-03 PROCEDURE — 77080 DXA BONE DENSITY AXIAL: CPT

## 2024-09-03 ASSESSMENT — LIFESTYLE VARIABLES
3_OR_MORE_DRINKS_PER_DAY: N
CURRENT_SMOKER: N

## 2024-09-19 ENCOUNTER — PATIENT MESSAGE (OUTPATIENT)
Dept: HEMATOLOGY/ONCOLOGY | Facility: HOSPITAL | Age: 64
End: 2024-09-19
Payer: COMMERCIAL

## 2024-09-19 DIAGNOSIS — R11.2 CHEMOTHERAPY INDUCED NAUSEA AND VOMITING: Primary | ICD-10-CM

## 2024-09-19 DIAGNOSIS — T45.1X5A CHEMOTHERAPY INDUCED NAUSEA AND VOMITING: Primary | ICD-10-CM

## 2024-09-19 RX ORDER — ONDANSETRON HYDROCHLORIDE 8 MG/1
8 TABLET, FILM COATED ORAL EVERY 6 HOURS PRN
COMMUNITY
End: 2024-09-19 | Stop reason: SDUPTHER

## 2024-09-19 RX ORDER — ONDANSETRON HYDROCHLORIDE 8 MG/1
8 TABLET, FILM COATED ORAL EVERY 6 HOURS PRN
Qty: 60 TABLET | Refills: 3 | Status: SHIPPED | OUTPATIENT
Start: 2024-09-19

## 2024-09-23 ENCOUNTER — LAB (OUTPATIENT)
Dept: LAB | Facility: LAB | Age: 64
End: 2024-09-23
Payer: COMMERCIAL

## 2024-09-23 DIAGNOSIS — C79.89 CHEST WALL RECURRENCE OF BREAST CANCER, LEFT (MULTI): ICD-10-CM

## 2024-09-23 DIAGNOSIS — Z17.0 MALIGNANT NEOPLASM OF LEFT BREAST IN FEMALE, ESTROGEN RECEPTOR POSITIVE, UNSPECIFIED SITE OF BREAST: ICD-10-CM

## 2024-09-23 DIAGNOSIS — C50.912 MALIGNANT NEOPLASM OF LEFT BREAST IN FEMALE, ESTROGEN RECEPTOR POSITIVE, UNSPECIFIED SITE OF BREAST: ICD-10-CM

## 2024-09-23 DIAGNOSIS — Z51.11 ENCOUNTER FOR ANTINEOPLASTIC CHEMOTHERAPY: ICD-10-CM

## 2024-09-23 DIAGNOSIS — Z79.811 AROMATASE INHIBITOR USE: ICD-10-CM

## 2024-09-23 DIAGNOSIS — Z90.12 HISTORY OF LEFT MASTECTOMY: ICD-10-CM

## 2024-09-23 DIAGNOSIS — C50.912 CHEST WALL RECURRENCE OF BREAST CANCER, LEFT (MULTI): ICD-10-CM

## 2024-09-23 LAB
ALBUMIN SERPL BCP-MCNC: 4 G/DL (ref 3.4–5)
ALP SERPL-CCNC: 97 U/L (ref 33–136)
ALT SERPL W P-5'-P-CCNC: 38 U/L (ref 7–45)
ANION GAP SERPL CALC-SCNC: 11 MMOL/L (ref 10–20)
AST SERPL W P-5'-P-CCNC: 31 U/L (ref 9–39)
BASOPHILS # BLD MANUAL: 0 X10*3/UL (ref 0–0.1)
BASOPHILS NFR BLD MANUAL: 0 %
BILIRUB SERPL-MCNC: 0.6 MG/DL (ref 0–1.2)
BUN SERPL-MCNC: 19 MG/DL (ref 6–23)
CALCIUM SERPL-MCNC: 8.8 MG/DL (ref 8.6–10.3)
CANCER AG27-29 SERPL-ACNC: 36.5 U/ML (ref 0–38.6)
CHLORIDE SERPL-SCNC: 103 MMOL/L (ref 98–107)
CO2 SERPL-SCNC: 29 MMOL/L (ref 21–32)
CREAT SERPL-MCNC: 1.28 MG/DL (ref 0.5–1.05)
EGFRCR SERPLBLD CKD-EPI 2021: 47 ML/MIN/1.73M*2
EOSINOPHIL # BLD MANUAL: 0 X10*3/UL (ref 0–0.7)
EOSINOPHIL NFR BLD MANUAL: 0 %
ERYTHROCYTE [DISTWIDTH] IN BLOOD BY AUTOMATED COUNT: 20.1 % (ref 11.5–14.5)
GLUCOSE SERPL-MCNC: 108 MG/DL (ref 74–99)
HCT VFR BLD AUTO: 32.5 % (ref 36–46)
HGB BLD-MCNC: 10.5 G/DL (ref 12–16)
IMM GRANULOCYTES # BLD AUTO: 0.01 X10*3/UL (ref 0–0.7)
IMM GRANULOCYTES NFR BLD AUTO: 0.5 % (ref 0–0.9)
LYMPHOCYTES # BLD MANUAL: 0.44 X10*3/UL (ref 1.2–4.8)
LYMPHOCYTES NFR BLD MANUAL: 23 %
MCH RBC QN AUTO: 33 PG (ref 26–34)
MCHC RBC AUTO-ENTMCNC: 32.3 G/DL (ref 32–36)
MCV RBC AUTO: 102 FL (ref 80–100)
MONOCYTES # BLD MANUAL: 0.17 X10*3/UL (ref 0.1–1)
MONOCYTES NFR BLD MANUAL: 9 %
NEUTS SEG # BLD MANUAL: 1.29 X10*3/UL (ref 1.2–7)
NEUTS SEG NFR BLD MANUAL: 68 %
NRBC BLD-RTO: 0 /100 WBCS (ref 0–0)
OVALOCYTES BLD QL SMEAR: ABNORMAL
PLATELET # BLD AUTO: 290 X10*3/UL (ref 150–450)
POTASSIUM SERPL-SCNC: 4.2 MMOL/L (ref 3.5–5.3)
PROT SERPL-MCNC: 6.9 G/DL (ref 6.4–8.2)
RBC # BLD AUTO: 3.18 X10*6/UL (ref 4–5.2)
RBC MORPH BLD: ABNORMAL
SODIUM SERPL-SCNC: 139 MMOL/L (ref 136–145)
TOTAL CELLS COUNTED BLD: 100
WBC # BLD AUTO: 1.9 X10*3/UL (ref 4.4–11.3)

## 2024-09-23 PROCEDURE — 36415 COLL VENOUS BLD VENIPUNCTURE: CPT

## 2024-09-23 PROCEDURE — 80053 COMPREHEN METABOLIC PANEL: CPT

## 2024-09-23 PROCEDURE — 86300 IMMUNOASSAY TUMOR CA 15-3: CPT

## 2024-09-23 PROCEDURE — 85027 COMPLETE CBC AUTOMATED: CPT

## 2024-09-23 PROCEDURE — 85007 BL SMEAR W/DIFF WBC COUNT: CPT

## 2024-09-24 ENCOUNTER — ONCOLOGY MEDICATION OUTREACH (OUTPATIENT)
Dept: HEMATOLOGY/ONCOLOGY | Facility: CLINIC | Age: 64
End: 2024-09-24
Payer: COMMERCIAL

## 2024-09-24 NOTE — PROGRESS NOTES
Reviewed labs and spoke with patient via telephone number listed in chart. Ok to continue Kisqali. Will discuss labs a next visit 10/9/24. Additionally, discussed fluid intake.     Shae Winston, PharmD  PGY-2 Oncology Pharmacy Resident

## 2024-10-02 ENCOUNTER — APPOINTMENT (OUTPATIENT)
Dept: RADIOLOGY | Facility: CLINIC | Age: 64
End: 2024-10-02
Payer: COMMERCIAL

## 2024-10-02 ENCOUNTER — HOSPITAL ENCOUNTER (OUTPATIENT)
Dept: RADIOLOGY | Facility: HOSPITAL | Age: 64
Discharge: HOME | End: 2024-10-02
Payer: COMMERCIAL

## 2024-10-02 DIAGNOSIS — Z79.811 AROMATASE INHIBITOR USE: ICD-10-CM

## 2024-10-02 DIAGNOSIS — Z17.0 MALIGNANT NEOPLASM OF LEFT BREAST IN FEMALE, ESTROGEN RECEPTOR POSITIVE, UNSPECIFIED SITE OF BREAST: ICD-10-CM

## 2024-10-02 DIAGNOSIS — Z51.11 ENCOUNTER FOR ANTINEOPLASTIC CHEMOTHERAPY: ICD-10-CM

## 2024-10-02 DIAGNOSIS — C50.912 CHEST WALL RECURRENCE OF BREAST CANCER, LEFT (MULTI): ICD-10-CM

## 2024-10-02 DIAGNOSIS — Z90.12 HISTORY OF LEFT MASTECTOMY: ICD-10-CM

## 2024-10-02 DIAGNOSIS — C50.912 MALIGNANT NEOPLASM OF LEFT BREAST IN FEMALE, ESTROGEN RECEPTOR POSITIVE, UNSPECIFIED SITE OF BREAST: ICD-10-CM

## 2024-10-02 DIAGNOSIS — C79.89 CHEST WALL RECURRENCE OF BREAST CANCER, LEFT (MULTI): ICD-10-CM

## 2024-10-02 PROCEDURE — 2550000001 HC RX 255 CONTRASTS: Performed by: INTERNAL MEDICINE

## 2024-10-02 PROCEDURE — 78306 BONE IMAGING WHOLE BODY: CPT

## 2024-10-02 PROCEDURE — 78306 BONE IMAGING WHOLE BODY: CPT | Performed by: RADIOLOGY

## 2024-10-02 PROCEDURE — A9503 TC99M MEDRONATE: HCPCS | Performed by: INTERNAL MEDICINE

## 2024-10-02 PROCEDURE — 74177 CT ABD & PELVIS W/CONTRAST: CPT

## 2024-10-02 PROCEDURE — 3430000001 HC RX 343 DIAGNOSTIC RADIOPHARMACEUTICALS: Performed by: INTERNAL MEDICINE

## 2024-10-09 ENCOUNTER — ONCOLOGY MEDICATION OUTREACH (OUTPATIENT)
Dept: HEMATOLOGY/ONCOLOGY | Facility: CLINIC | Age: 64
End: 2024-10-09
Payer: COMMERCIAL

## 2024-10-09 ENCOUNTER — OFFICE VISIT (OUTPATIENT)
Dept: HEMATOLOGY/ONCOLOGY | Facility: CLINIC | Age: 64
End: 2024-10-09
Payer: COMMERCIAL

## 2024-10-09 VITALS
HEART RATE: 85 BPM | WEIGHT: 193.12 LBS | DIASTOLIC BLOOD PRESSURE: 89 MMHG | SYSTOLIC BLOOD PRESSURE: 143 MMHG | TEMPERATURE: 98.4 F | BODY MASS INDEX: 30.85 KG/M2

## 2024-10-09 DIAGNOSIS — Z51.11 ENCOUNTER FOR ANTINEOPLASTIC CHEMOTHERAPY: ICD-10-CM

## 2024-10-09 DIAGNOSIS — Z79.811 AROMATASE INHIBITOR USE: ICD-10-CM

## 2024-10-09 DIAGNOSIS — Z17.0 MALIGNANT NEOPLASM OF LEFT BREAST IN FEMALE, ESTROGEN RECEPTOR POSITIVE, UNSPECIFIED SITE OF BREAST: ICD-10-CM

## 2024-10-09 DIAGNOSIS — Z90.12 HISTORY OF LEFT MASTECTOMY: ICD-10-CM

## 2024-10-09 DIAGNOSIS — C50.912 CHEST WALL RECURRENCE OF BREAST CANCER, LEFT (MULTI): ICD-10-CM

## 2024-10-09 DIAGNOSIS — C79.89 CHEST WALL RECURRENCE OF BREAST CANCER, LEFT (MULTI): ICD-10-CM

## 2024-10-09 DIAGNOSIS — C50.912 MALIGNANT NEOPLASM OF LEFT BREAST IN FEMALE, ESTROGEN RECEPTOR POSITIVE, UNSPECIFIED SITE OF BREAST: ICD-10-CM

## 2024-10-09 PROCEDURE — 99214 OFFICE O/P EST MOD 30 MIN: CPT | Performed by: INTERNAL MEDICINE

## 2024-10-09 ASSESSMENT — PAIN SCALES - GENERAL: PAINLEVEL: 0-NO PAIN

## 2024-10-09 NOTE — PATIENT INSTRUCTIONS
Please call us at [phone] option 5 then option 2 with any questions or concerns   3 month follow-up with mike duvall and bloodwork  CT scan about 1 wk prior  Continue exemestane and ribociclib

## 2024-10-11 ASSESSMENT — ENCOUNTER SYMPTOMS
SHORTNESS OF BREATH: 0
HEMOPTYSIS: 0
CARDIOVASCULAR NEGATIVE: 1
CONFUSION: 0
CONSTIPATION: 0
COUGH: 0
DEPRESSION: 0
ADENOPATHY: 0
WHEEZING: 0
FEVER: 0
ABDOMINAL PAIN: 0
BACK PAIN: 0
FREQUENCY: 0
ARTHRALGIAS: 0
DIFFICULTY URINATING: 0
HEMATURIA: 0
HOT FLASHES: 0
SLEEP DISTURBANCE: 0
DIARRHEA: 0
DIZZINESS: 0
EYE PROBLEMS: 0
BLOOD IN STOOL: 0
ABDOMINAL DISTENTION: 0
PALPITATIONS: 0
DIAPHORESIS: 0
NERVOUS/ANXIOUS: 0
SCLERAL ICTERUS: 0
RESPIRATORY NEGATIVE: 1
APPETITE CHANGE: 0
SEIZURES: 0
HEADACHES: 0
NAUSEA: 0
DYSURIA: 0
MYALGIAS: 0
BRUISES/BLEEDS EASILY: 0
LEG SWELLING: 0
FATIGUE: 1
EYES NEGATIVE: 1
CHILLS: 0
CHEST TIGHTNESS: 0
EXTREMITY WEAKNESS: 0
NUMBNESS: 0

## 2024-10-11 NOTE — PROGRESS NOTES
Breast Medical Oncology Clinic  Location: [facility]      BREAST CANCER DIAGNOSIS  Breast cancer, left (Multi), Clinical: Stage IIA (cT2, cN0, cM0, G3, ER+, KY+, HER2-)   PIK3CA mutation [id number].    ONCOLOGIC HISTORY  Treatment Synopsis:    History of left-sided invasive ductal carcinoma, stage  IIA (T2 N0 M0). The patient's breast cancer was diagnosed on October 20, 2016, and is estrogen receptor positive at 95%, progesterone receptor positive at 90%, and HER-2/renae 2+ by IHC and not amplified  by FISH at 1.3.      In addition patient has an oncotype DX recurrence score of 31 translating to a 10-year distant recurrence rate of 21%.      Patient has completed a left mastectomy without reconstruction with SLNB on 11/11/2016. Pathology showed a 2.2cm mass, grade 3, with 0/7 LNs involved.     10/31/2016 genetic testing negative     4/2017 Initiated on Anastrazole     10/2018 changed to letrozole for intolerable side effects with anastrazole.     4/2022 Completed 5 year course antiestrogen therapy               Treatment History:       2016-11-11: Cancer Related Surgery:      Completed left mastectomy  2016-12-28: Chemotherapy:        Received cycle # of Taxotere and Cyclophosphamide  2017-3-1: Chemotherapy:            Completed 4 cycles of TC from 12/18/16 through to 03/01/2017 2017-4-12: New Treatment Plan:              Started arimidex  2018-10-26: New Treatment Plan:            Letrozole 2.5mg po daily started. Arimidex discontinued due to arthralgia.     CURRENT THERAPY  Exemestane/ribociclib since July 2024    HISTORY OF PRESENT ILLNESS    Christine Malik is a 64 y.o. woman here for follow-up. She was recently diagnosed with oligometastatic ER+ breast cancer. She had a recent left chest wall mass which revealed recurrent breast cancer grade 3 ER+ 90%/KY+ 30%/HER2 2+ but FISH negative. Had been on letrozole for about 5 yrs completed 4/2022. Tolerating treatment well. No issues. Recent CT scans from this month show  continued response.            Review of Systems   Constitutional:  Positive for fatigue. Negative for appetite change, chills, diaphoresis and fever.   HENT:  Negative.  Negative for hearing loss and lump/mass.    Eyes: Negative.  Negative for eye problems and icterus.   Respiratory: Negative.  Negative for chest tightness, cough, hemoptysis, shortness of breath and wheezing.    Cardiovascular: Negative.  Negative for chest pain, leg swelling and palpitations.   Gastrointestinal:  Negative for abdominal distention, abdominal pain, blood in stool, constipation, diarrhea and nausea.   Endocrine: Negative for hot flashes.   Genitourinary:  Negative for bladder incontinence, difficulty urinating, dyspareunia, dysuria, frequency and hematuria.    Musculoskeletal:  Negative for arthralgias, back pain, gait problem and myalgias.   Neurological:  Negative for dizziness, extremity weakness, gait problem, headaches, numbness and seizures.   Hematological:  Negative for adenopathy. Does not bruise/bleed easily.   Psychiatric/Behavioral:  Negative for confusion, depression and sleep disturbance. The patient is not nervous/anxious.    All other systems reviewed and are negative.        Past Medical History:  has a past medical history of Anxiety disorder, unspecified (2015), Encounter for other preprocedural examination (10/28/2016), Encounter for other screening for malignant neoplasm of breast (10/03/2016), Other abnormal and inconclusive findings on diagnostic imaging of breast (10/20/2016), Pelvic and perineal pain (10/03/2016), Personal history of malignant neoplasm of breast (2016), Personal history of other diseases of the circulatory system (2015), and Unspecified lump in the left breast, unspecified quadrant (10/20/2016).  Surgical History:   has a past surgical history that includes Dilation and curettage of uterus (2014); Hysterectomy (2014);  section, classic (2014); Other  surgical history (2014); Other surgical history (2016); [place] lymph node biopsy (2016); and Mastectomy (Left).  Social History:   reports that she has never smoked. She has never used smokeless tobacco. She reports that she does not drink alcohol and does not use drugs.  Family History:  No family history on file.  Family Oncology History:  Cancer-related family history is not on file.      OBJECTIVE    VS / Pain:  /89 (BP Location: Right arm, Patient Position: Sitting, BP Cuff Size: Large adult)   Pulse 85   Temp 36.9 °C (98.4 °F) (Temporal)   Wt 87.6 kg (193 lb 2 oz)   BMI 30.85 kg/m²   BSA: 2.02 meters squared   Pain Scale: 0    Performance Status:  The ECOG performance scale today is ECO- Fully active, able to carry on all pre-disease performance w/o restriction.    Physical Exam  Constitutional:       General: She is not in acute distress.     Appearance: She is not ill-appearing, toxic-appearing or diaphoretic.   HENT:      Nose: No congestion or rhinorrhea.      Mouth/Throat:      Pharynx: No posterior oropharyngeal erythema.   Cardiovascular:      Rate and Rhythm: Normal rate and regular rhythm.      Pulses: Normal pulses.      Heart sounds: Normal heart sounds. No murmur heard.     No gallop.   Pulmonary:      Breath sounds: Normal breath sounds.   Abdominal:      General: There is no distension.      Palpations: There is no mass.      Tenderness: There is no abdominal tenderness.   Musculoskeletal:         General: No swelling.      Cervical back: No rigidity.      Right lower leg: No edema.      Left lower leg: No edema.   Lymphadenopathy:      Cervical: No cervical adenopathy.   Skin:     General: Skin is warm.      Coloration: Skin is not cyanotic.      Findings: No bruising, ecchymosis or erythema.   Neurological:      General: No focal deficit present.      Mental Status: She is oriented to person, place, and time.      Cranial Nerves: Cranial nerves 2-12 are intact.       Motor: Motor function is intact.   Psychiatric:         Attention and Perception: Attention and perception normal.         Mood and Affect: Mood and affect normal.         Behavior: Behavior normal.         Thought Content: Thought content normal.         Judgment: Judgment normal.           Diagnostic Results   === 10/02/24 ===    CT CHEST ABDOMEN PELVIS W IV CONTRAST    - Impression -  CHEST:  1.  Findings consistent with a regression of chest wall recurrence of  neoplastic disease as detailed above.  2. No emerging metastatic lesions.    ABDOMEN-PELVIS:  1.  No emerging metastatic lesions.      MACRO:  None    Signed by: Joseph Schoenberger 10/2/2024 12:41 PM  Dictation workstation:   [id number]     Interpreted By:  Robert Luis,   STUDY:  DEXA BONE DENSITY9/3/2024 2:40 pm      INDICATION:  Signs/Symptoms:evaluate for osteopenia given she's on anti-estrogen  therapy.  The patient is a 65 y/o  year old F.      ,C50.912 Malignant neoplasm of unspecified site of left female breast  (Multi),Z17.0 Estrogen receptor positive status (ER+)      COMPARISON:  None.      ACCESSION NUMBER(S):  [id number]      ORDERING CLINICIAN:  MOLINA HAAS      TECHNIQUE:  DEXA BONE DENSITY      FINDINGS:  SPINE L1-L4  Bone Mineral Density: 1.034  T-Score -1.2  Z-Score 0.2  Bone Mineral Density change vs baseline:  Not reported  Bone Mineral Density change vs previous: Not reported      LEFT FEMUR -TOTAL  Bone Mineral Density: 1.097  T-Score 0.7   Z-Score  1.8  Bone Mineral Density change vs baseline: Not reported  Bone Mineral Density change vs previous: Not reported      LEFT FEMUR -NECK  Bone Mineral Density: 1.000  T-Score -0.3  Z-Score 1.1  Bone Mineral Density change vs baseline: Not reported  Bone Mineral Density change vs previous: Not reported      LEFT FOREARM  Total  Bone Mineral Density: Not reported  T-Score Not reported  Z-Score Not reported  UD  Bone Mineral Density: Not reported  T-Score Not reported Z-Score  Not reported  MID  Bone Mineral Density: Not reported  T-Score Not reported Z-Score Not reported  1/3  Bone Mineral Density: Not reported  T-Score Not reported   Z-Score Not reported  Bone Mineral Density change vs baseline:  Not reported  Bone Mineral Density change vs previous:  Not reported      RIGHT FEMUR -TOTAL  Bone Mineral Density: Not reported  T-Score Not reported   Z-Score Not reported  Bone Mineral Density change vs baseline:  Not reported  Bone Mineral Density change vs previous: Not reported      RIGHT FEMUR -NECK  Bone Mineral Density: Not reported  T-Score Not reported   Z-Score  Not reported  Bone Mineral Density change vs baseline:  Bone Mineral Density change vs previous:      RIGHT FOREARM  Bone Mineral Density: Not reported  T-Score Not reported   Z-Score Not reported  UD  Bone Mineral Density: Not reported  T-Score Not reported  Z-Score Not reported  MID  Bone Mineral Density: Not reported  T-Score Not reported  Z-Score  Not reported  1/3  Bone Mineral Density: Not reported  T-Score Not reported  Z-Score Not reported  Bone Mineral Density change vs baseline:  Not reported  Bone Mineral Density change vs previous:  Not reported          World Health Organization (WHO) criteria for post-menopausal,   Women:  Normal:         T-score at or above -1 SD  Osteopenia:   T-score between -1 and -2.5 SD  Osteoporosis: T-score at or below -2.5 SD          10-year Fracture Risk:  Major Osteoporotic Fracture  6.5  Hip Fracture                        0.2      Note:  If no FRAX score is reported, it is because:  Some T-score for Spine Total or Hip Total or Femoral Neck at or below  -2.5          IMPRESSION:  DEXA:  According to World Health Organization criteria,  classification is low bone mass (osteopenia)      Followup recommended in two years or sooner as clinically warranted.      LABORATORY/PATHOLOGY DATA    Lab on 09/23/2024   Component Date Value Ref Range Status    WBC 09/23/2024 1.9 (L)  4.4  - 11.3 x10*3/uL Final    nRBC 09/23/2024 0.0  0.0 - 0.0 /100 WBCs Final    RBC 09/23/2024 3.18 (L)  4.00 - 5.20 x10*6/uL Final    Hemoglobin 09/23/2024 10.5 (L)  12.0 - 16.0 g/dL Final    Hematocrit 09/23/2024 32.5 (L)  36.0 - 46.0 % Final    MCV 09/23/2024 102 (H)  80 - 100 fL Final    MCH 09/23/2024 33.0  26.0 - 34.0 pg Final    MCHC 09/23/2024 32.3  32.0 - 36.0 g/dL Final    RDW 09/23/2024 20.1 (H)  11.5 - 14.5 % Final    Platelets 09/23/2024 290  150 - 450 x10*3/uL Final    Immature Granulocytes %, Automated 09/23/2024 0.5  0.0 - 0.9 % Final    Immature Granulocytes Absolute, Au* 09/23/2024 0.01  0.00 - 0.70 x10*3/uL Final    Glucose 09/23/2024 108 (H)  74 - 99 mg/dL Final    Sodium 09/23/2024 139  136 - 145 mmol/L Final    Potassium 09/23/2024 4.2  3.5 - 5.3 mmol/L Final    Chloride 09/23/2024 103  98 - 107 mmol/L Final    Bicarbonate 09/23/2024 29  21 - 32 mmol/L Final    Anion Gap 09/23/2024 11  10 - 20 mmol/L Final    Urea Nitrogen 09/23/2024 19  6 - 23 mg/dL Final    Creatinine 09/23/2024 1.28 (H)  0.50 - 1.05 mg/dL Final    eGFR 09/23/2024 47 (L)  >60 mL/min/1.73m*2 Final    Calcium 09/23/2024 8.8  8.6 - 10.3 mg/dL Final    Albumin 09/23/2024 4.0  3.4 - 5.0 g/dL Final    Alkaline Phosphatase 09/23/2024 97  33 - 136 U/L Final    Total Protein 09/23/2024 6.9  6.4 - 8.2 g/dL Final    AST 09/23/2024 31  9 - 39 U/L Final    Bilirubin, Total 09/23/2024 0.6  0.0 - 1.2 mg/dL Final    ALT 09/23/2024 38  7 - 45 U/L Final    CA 27.29 09/23/2024 36.5  0.0 - 38.6 U/mL Final    Neutrophils %, Manual 09/23/2024 68.0  40.0 - 80.0 % Final    Lymphocytes %, Manual 09/23/2024 23.0  13.0 - 44.0 % Final    Monocytes %, Manual 09/23/2024 9.0  2.0 - 10.0 % Final    Eosinophils %, Manual 09/23/2024 0.0  0.0 - 6.0 % Final    Basophils %, Manual 09/23/2024 0.0  0.0 - 2.0 % Final    Seg Neutrophils Absolute, Manual 09/23/2024 1.29  1.20 - 7.00 x10*3/uL Final    Lymphocytes Absolute, Manual 09/23/2024 0.44 (L)  1.20 - 4.80 x10*3/uL  Final    Monocytes Absolute, Manual 2024 0.17  0.10 - 1.00 x10*3/uL Final    Eosinophils Absolute, Manual 2024 0.00  0.00 - 0.70 x10*3/uL Final    Basophils Absolute, Manual 2024 0.00  0.00 - 0.10 x10*3/uL Final    Total Cells Counted 2024 100   Final    RBC Morphology 2024 See Below   Final    Ovalocytes 2024 Few   Final      Lab Results   Component Value Date    LABCA2 36.5 2024    LABCA2 36.3 07/10/2024    LABCA2 39.3 (H) 2024    LABCA2 44.3 (H) 2024       FINAL DIAGNOSIS   A. Left chest wall mass, 10:00, ultrasound guided core needle biopsy:  -- Invasive ductal carcinoma, grade 3 involving fibrous tissue and skeletal muscle, see note.     Note:  In this limited sample, the invasive carcinoma measures up to 12 mm in greatest dimension.  ER, MT and HER2 will be reported in an addendum.      Electronically signed by Gabby Mcleod MD on 2024 at [address] [place] [address]   By the signature on this report, the individual or group listed as making the Final Interpretation/Diagnosis certifies that they have reviewed this case.    Addendum   Surgical/Block Number:        [id number]  Specimen Site:         Left chest wall mass  Specimen Type:       core needle biopsy        Estrogen Receptor (clone SP1):                     Positive                                                                          Percentage with nuclear stainin-95%                                                                          Intensity of staining: Moderate to Strong     Progesterone Receptor (clone SP2): Positive                                                                         Percentage with nuclear stainin-40%                                                                          Intensity of staining: Moderate to Strong     HER2 (clone [id number]):                                                                                                                     Equivocal (2+).  JESSICA is pending and will be reported in addendum.                                                                            Comment:         Addendum electronically signed by Gabby Mcleod MD on 5/16/2024 at 1014   Addendum   HER2 DUAL JESSICA FOR DETECTION OF HER2 GENE AMPLIFICATION  HER2 Dual JESSICA DNA Probe Cocktail Assay from [employer] ([employer])    Results are expressed as the averaged ratio HER2/chromosome 17 signals in 20 nuclei.        Source of Specimen: Left chest wall mass biopsy  Paraffin Block: [id number] A1  Duration of fixation:  unless otherwise noted, 6-72 hours fixation     TEST RESULTS:    Number of tumor cells counted:  20  Number of observers: 1  Average number of HER2 signals/nucleus:  2.6  Average number of CEP 17 signals/nucleus: 2.1  Ratio of average HER2/CEP 17:  1.2     INTERPRETATION:       Negative (Not amplified)         IMPRESSION/PLAN    Recurrent oligometastatic ER+/SC+/HER2 2+ breast cancer with chest wall/sternal mass and left axillary and intrathoracic nodes. Continue present therapy and repeat CT scan in about 3 months. Follow-up with mike duvall 1-2 wks after CT scan. Baseline DEXA scan showed mild osteopenia.  Monitor for now. Plan will be once she has maximal response to send back to Dr Damon for radiation therapy.    We discussed the clinical significance of diagnosis, goals of care and treatment plan in detail.     I personally spent over half of a total 40 minutes face to face with the patient in counseling and discussion and/or coordination of care as described above.         -------------------------------------------------------------------------------------------------------------------------------  Keshav Camarena MD  Director of Breast Cancer Medical Oncology Research Program   [facility]  Professor of Medicine  [address]  [place] [address], [address]  [place] [address]  Phone: [phone]  [email or web address]

## 2024-10-23 ENCOUNTER — SOCIAL WORK (OUTPATIENT)
Dept: CASE MANAGEMENT | Facility: HOSPITAL | Age: 64
End: 2024-10-23
Payer: COMMERCIAL

## 2024-10-23 NOTE — PROGRESS NOTES
SW received email from patient today seeking co-pay assistance for her Kisqali. Patient had received financial assistance through [employer] but has exhausted her first 5 months and one extra month. Patient receives her Kisqali from [facility] [phone]. SW called [employer] today and was informed they are in process of applying for funds through financial team. Patient will received a call once approved. Patient can call to check in and speak to financial team if needed. SW emailed patient today with an update.

## 2024-10-31 ENCOUNTER — TELEPHONE (OUTPATIENT)
Dept: ADMISSION | Facility: HOSPITAL | Age: 64
End: 2024-10-31
Payer: COMMERCIAL

## 2024-10-31 DIAGNOSIS — C50.912 MALIGNANT NEOPLASM OF LEFT BREAST IN FEMALE, ESTROGEN RECEPTOR POSITIVE, UNSPECIFIED SITE OF BREAST: ICD-10-CM

## 2024-10-31 DIAGNOSIS — Z17.0 MALIGNANT NEOPLASM OF LEFT BREAST IN FEMALE, ESTROGEN RECEPTOR POSITIVE, UNSPECIFIED SITE OF BREAST: ICD-10-CM

## 2024-12-05 ENCOUNTER — APPOINTMENT (OUTPATIENT)
Dept: RADIOLOGY | Facility: CLINIC | Age: 64
End: 2024-12-05
Payer: COMMERCIAL

## 2024-12-27 ENCOUNTER — LAB (OUTPATIENT)
Dept: LAB | Facility: LAB | Age: 64
End: 2024-12-27
Payer: COMMERCIAL

## 2024-12-27 ENCOUNTER — HOSPITAL ENCOUNTER (OUTPATIENT)
Dept: RADIOLOGY | Facility: HOSPITAL | Age: 64
Discharge: HOME | End: 2024-12-27
Payer: COMMERCIAL

## 2024-12-27 DIAGNOSIS — C50.912 MALIGNANT NEOPLASM OF LEFT BREAST IN FEMALE, ESTROGEN RECEPTOR POSITIVE, UNSPECIFIED SITE OF BREAST: ICD-10-CM

## 2024-12-27 DIAGNOSIS — Z51.11 ENCOUNTER FOR ANTINEOPLASTIC CHEMOTHERAPY: ICD-10-CM

## 2024-12-27 DIAGNOSIS — Z79.811 AROMATASE INHIBITOR USE: ICD-10-CM

## 2024-12-27 DIAGNOSIS — C79.89 CHEST WALL RECURRENCE OF BREAST CANCER, LEFT (MULTI): ICD-10-CM

## 2024-12-27 DIAGNOSIS — C50.912 CHEST WALL RECURRENCE OF BREAST CANCER, LEFT (MULTI): ICD-10-CM

## 2024-12-27 DIAGNOSIS — Z17.0 MALIGNANT NEOPLASM OF LEFT BREAST IN FEMALE, ESTROGEN RECEPTOR POSITIVE, UNSPECIFIED SITE OF BREAST: ICD-10-CM

## 2024-12-27 DIAGNOSIS — Z90.12 HISTORY OF LEFT MASTECTOMY: ICD-10-CM

## 2024-12-27 LAB
ALBUMIN SERPL BCP-MCNC: 4.1 G/DL (ref 3.4–5)
ALP SERPL-CCNC: 96 U/L (ref 33–136)
ALT SERPL W P-5'-P-CCNC: 18 U/L (ref 7–45)
ANION GAP SERPL CALC-SCNC: 10 MMOL/L (ref 10–20)
AST SERPL W P-5'-P-CCNC: 16 U/L (ref 9–39)
BASOPHILS # BLD MANUAL: 0 X10*3/UL (ref 0–0.1)
BASOPHILS NFR BLD MANUAL: 0 %
BILIRUB SERPL-MCNC: 0.5 MG/DL (ref 0–1.2)
BUN SERPL-MCNC: 16 MG/DL (ref 6–23)
CALCIUM SERPL-MCNC: 9 MG/DL (ref 8.6–10.3)
CANCER AG27-29 SERPL-ACNC: 40.9 U/ML (ref 0–38.6)
CHLORIDE SERPL-SCNC: 102 MMOL/L (ref 98–107)
CO2 SERPL-SCNC: 31 MMOL/L (ref 21–32)
CREAT SERPL-MCNC: 1.21 MG/DL (ref 0.5–1.05)
EGFRCR SERPLBLD CKD-EPI 2021: 50 ML/MIN/1.73M*2
EOSINOPHIL # BLD MANUAL: 0.02 X10*3/UL (ref 0–0.7)
EOSINOPHIL NFR BLD MANUAL: 1 %
ERYTHROCYTE [DISTWIDTH] IN BLOOD BY AUTOMATED COUNT: 17.3 % (ref 11.5–14.5)
GLUCOSE SERPL-MCNC: 97 MG/DL (ref 74–99)
HCT VFR BLD AUTO: 33.4 % (ref 36–46)
HGB BLD-MCNC: 11.4 G/DL (ref 12–16)
IMM GRANULOCYTES # BLD AUTO: 0.01 X10*3/UL (ref 0–0.7)
IMM GRANULOCYTES NFR BLD AUTO: 0.6 % (ref 0–0.9)
LYMPHOCYTES # BLD MANUAL: 0.54 X10*3/UL (ref 1.2–4.8)
LYMPHOCYTES NFR BLD MANUAL: 32 %
MCH RBC QN AUTO: 34.8 PG (ref 26–34)
MCHC RBC AUTO-ENTMCNC: 34.1 G/DL (ref 32–36)
MCV RBC AUTO: 102 FL (ref 80–100)
MONOCYTES # BLD MANUAL: 0.24 X10*3/UL (ref 0.1–1)
MONOCYTES NFR BLD MANUAL: 14 %
NEUTS SEG # BLD MANUAL: 0.9 X10*3/UL (ref 1.2–7)
NEUTS SEG NFR BLD MANUAL: 53 %
NRBC BLD-RTO: 0 /100 WBCS (ref 0–0)
PLATELET # BLD AUTO: 184 X10*3/UL (ref 150–450)
POTASSIUM SERPL-SCNC: 3.9 MMOL/L (ref 3.5–5.3)
PROT SERPL-MCNC: 6.8 G/DL (ref 6.4–8.2)
RBC # BLD AUTO: 3.28 X10*6/UL (ref 4–5.2)
RBC MORPH BLD: ABNORMAL
SODIUM SERPL-SCNC: 139 MMOL/L (ref 136–145)
TOTAL CELLS COUNTED BLD: 100
WBC # BLD AUTO: 1.7 X10*3/UL (ref 4.4–11.3)

## 2024-12-27 PROCEDURE — 2550000001 HC RX 255 CONTRASTS: Performed by: INTERNAL MEDICINE

## 2024-12-27 PROCEDURE — 74177 CT ABD & PELVIS W/CONTRAST: CPT

## 2024-12-27 PROCEDURE — 80053 COMPREHEN METABOLIC PANEL: CPT

## 2024-12-27 PROCEDURE — 86300 IMMUNOASSAY TUMOR CA 15-3: CPT

## 2024-12-27 PROCEDURE — 85007 BL SMEAR W/DIFF WBC COUNT: CPT

## 2024-12-27 PROCEDURE — 85027 COMPLETE CBC AUTOMATED: CPT

## 2024-12-30 ENCOUNTER — TELEPHONE (OUTPATIENT)
Dept: HEMATOLOGY/ONCOLOGY | Facility: HOSPITAL | Age: 64
End: 2024-12-30
Payer: COMMERCIAL

## 2025-01-08 ENCOUNTER — OFFICE VISIT (OUTPATIENT)
Dept: HEMATOLOGY/ONCOLOGY | Facility: CLINIC | Age: 65
End: 2025-01-08
Payer: COMMERCIAL

## 2025-01-08 ENCOUNTER — ONCOLOGY MEDICATION OUTREACH (OUTPATIENT)
Dept: HEMATOLOGY/ONCOLOGY | Facility: CLINIC | Age: 65
End: 2025-01-08
Payer: COMMERCIAL

## 2025-01-08 ENCOUNTER — HOSPITAL ENCOUNTER (OUTPATIENT)
Dept: RADIOLOGY | Facility: CLINIC | Age: 65
Discharge: HOME | End: 2025-01-08
Payer: COMMERCIAL

## 2025-01-08 VITALS — BODY MASS INDEX: 31.04 KG/M2 | WEIGHT: 193.12 LBS | HEIGHT: 66 IN

## 2025-01-08 DIAGNOSIS — R53.83 CHEMOTHERAPY-INDUCED FATIGUE: ICD-10-CM

## 2025-01-08 DIAGNOSIS — Z79.811 AROMATASE INHIBITOR USE: ICD-10-CM

## 2025-01-08 DIAGNOSIS — Z17.0 MALIGNANT NEOPLASM OF LEFT BREAST IN FEMALE, ESTROGEN RECEPTOR POSITIVE, UNSPECIFIED SITE OF BREAST: ICD-10-CM

## 2025-01-08 DIAGNOSIS — Z85.3 ENCOUNTER FOR FOLLOW-UP SURVEILLANCE OF BREAST CANCER: ICD-10-CM

## 2025-01-08 DIAGNOSIS — Z08 ENCOUNTER FOR FOLLOW-UP SURVEILLANCE OF BREAST CANCER: ICD-10-CM

## 2025-01-08 DIAGNOSIS — C50.912 MALIGNANT NEOPLASM OF LEFT BREAST IN FEMALE, ESTROGEN RECEPTOR POSITIVE, UNSPECIFIED SITE OF BREAST: ICD-10-CM

## 2025-01-08 DIAGNOSIS — Z90.12 HISTORY OF LEFT MASTECTOMY: ICD-10-CM

## 2025-01-08 DIAGNOSIS — T45.1X5A CHEMOTHERAPY-INDUCED FATIGUE: ICD-10-CM

## 2025-01-08 DIAGNOSIS — C79.89 CHEST WALL RECURRENCE OF BREAST CANCER, LEFT (MULTI): ICD-10-CM

## 2025-01-08 DIAGNOSIS — C50.912 CHEST WALL RECURRENCE OF BREAST CANCER, LEFT (MULTI): ICD-10-CM

## 2025-01-08 DIAGNOSIS — Z51.11 ENCOUNTER FOR ANTINEOPLASTIC CHEMOTHERAPY: ICD-10-CM

## 2025-01-08 PROCEDURE — 77067 SCR MAMMO BI INCL CAD: CPT | Mod: 52,RT

## 2025-01-08 PROCEDURE — 99214 OFFICE O/P EST MOD 30 MIN: CPT | Performed by: NURSE PRACTITIONER

## 2025-01-08 PROCEDURE — 77063 BREAST TOMOSYNTHESIS BI: CPT | Mod: RIGHT SIDE | Performed by: RADIOLOGY

## 2025-01-08 PROCEDURE — 77067 SCR MAMMO BI INCL CAD: CPT | Mod: RIGHT SIDE | Performed by: RADIOLOGY

## 2025-01-08 PROCEDURE — 1036F TOBACCO NON-USER: CPT | Performed by: NURSE PRACTITIONER

## 2025-01-08 ASSESSMENT — PATIENT HEALTH QUESTIONNAIRE - PHQ9
2. FEELING DOWN, DEPRESSED OR HOPELESS: NOT AT ALL
SUM OF ALL RESPONSES TO PHQ9 QUESTIONS 1 & 2: 0
1. LITTLE INTEREST OR PLEASURE IN DOING THINGS: NOT AT ALL

## 2025-01-08 NOTE — PATIENT INSTRUCTIONS
Please continue daily Exemestane and 600 mg Kisqali 3 weeks on and one week off.    Planned repeat scans in 3 months and follow up Dr Camarena around 5/14/25 with labs.    Will consider radiation with Dr Damon as you continue to have improvement on scans.    Please call [phone] with any questions or concerns prior to your next office visit.

## 2025-01-08 NOTE — PROGRESS NOTES
Breast Medical Oncology Clinic  Location: [facility]      BREAST CANCER DIAGNOSIS  Breast cancer, left (Multi), Clinical: Stage IIA (cT2, cN0, cM0, G3, ER+, OK+, HER2-)   PIK3CA mutation [id number].    ONCOLOGIC HISTORY  Treatment Synopsis:    History of left-sided invasive ductal carcinoma, stage  IIA (T2 N0 M0). The patient's breast cancer was diagnosed on October 20, 2016, and is estrogen receptor positive at 95%, progesterone receptor positive at 90%, and HER-2/renae 2+ by IHC and not amplified  by FISH at 1.3. Oncotype DX recurrence score of 31 translating to a 10-year distant recurrence rate of 21%. Left mastectomy without reconstruction with SLNB on 11/11/2016. Pathology showed a 2.2cm mass, grade 3, with 0/7 LNs involved. Adjuvant chemotherapy with 4 cycles Taxotere and Cyclophosphamide 12/18/16 through to 03/01/2017.      10/31/2016 genetic testing negative     4/2017 Initiated on Anastrazole     10/2018 changed to letrozole for intolerable side effects with anastrazole.     4/2022 Completed 5 year course antiestrogen therapy      May 2024 Recurrence and progression of cancer left parasternal chest wall lesion and sternum.     July 2024 Initiated on Exemestane and Ribociclib.      CURRENT THERAPY  Exemestane/ribociclib since July 2024    HISTORY OF PRESENT ILLNESS    Christine Malik is a 64 y.o. woman here for follow-up. Today I have reviewed with the patient I will be conducting a clinical physical breast exam. Patient has declined a second medical professional today during the exam as a chapperone.     She was diagnosed with oligometastatic ER+ breast cancer May 2024. She had a recent left chest wall mass which revealed recurrent breast cancer grade 3 ER+ 90%/OK+ 30%/HER2 2+ but FISH negative. Had been on letrozole for about 5 yrs completed 4/2022. Updated scans Dec 2024 showing stable disease. She is doing well, has no new concerns for worsening of IV cancer. Biggest intolerance to therapy is fatigue but only  needs to go bed early by 8 pm most nights of the week. She continues to work full time and is in person 2 days a week and at home 3 days a week. She continues compliant on daily exemestane and 3 weeks on Kisqali. She is currently on her 2 week of Kisqail and continues on 600mg dose.     She denies any chest pain or breathing issues but has baseline exertional sob. She denies any new concerning cough. Due to exertional sob is asking about handicap placard.      She denies any vision changes or headache issues, dizziness or loss of balance, no falls.     She denies any new or unexplained bone aches or pains    She denies any skin lesions or masses, oral sores lesions or infections.     She reports a normal appetite. She has loose stools most days but does not need imodium. She reports normal urination medication.     Review of Systems  ROS 14 points performed, See HPI for exceptions      Past Medical History:  has a past medical history of Anxiety disorder, unspecified (2015), Encounter for other preprocedural examination (10/28/2016), Encounter for other screening for malignant neoplasm of breast (10/03/2016), Other abnormal and inconclusive findings on diagnostic imaging of breast (10/20/2016), Pelvic and perineal pain (10/03/2016), Personal history of malignant neoplasm of breast (2016), Personal history of other diseases of the circulatory system (2015), and Unspecified lump in the left breast, unspecified quadrant (10/20/2016).  Surgical History:   has a past surgical history that includes Dilation and curettage of uterus (2014); Hysterectomy (2014);  section, classic (2014); Other surgical history (2014); Other surgical history (2016); [place] lymph node biopsy (2016); and Mastectomy (Left).  Social History:   reports that she has never smoked. She has never used smokeless tobacco. She reports that she does not drink alcohol and does not use  drugs.  She is , lives in a house house with family, completed 10th grade, currently employed as a      GYN: , Had her menarche at age 14 years, 1st pregnancy at age 27 years, menopause in  , never used BCP or HRT       Family History:    Family History   Problem Relation Name Age of Onset    Breast cancer Mother  40     Family Oncology History:  Cancer-related family history includes Breast cancer (age of onset: 40) in her mother.      OBJECTIVE    VS / Pain:  There were no vitals taken for this visit.  BSA: There is no height or weight on file to calculate BSA.   Pain Scale: 0    Performance Status:  The ECOG performance scale today is ECO- Fully active, able to carry on all pre-disease performance w/o restriction.      Physical Exam  Constitutional: Well developed, awake/alert/oriented x4, no distress, alert and cooperative  EYES: Sclera clear  ENMT: mucous membranes moist, no apparent injury, no lesions seen  Head/Neck: Neck supple, no apparent injury, thyroid without mass or tenderness, No JVD, trachea midline, no bruits  Respiratory / Thoracic: Patent airways, clear to all lobes, normal breath sounds with good chest expansion, thorax symmetric.  Cardiovascular: Regular, rate and rhythm, no murmurs, 2+ equal pulses of the extremities, normal auscultated S 1and S 2  GI: Nondistended, soft, non-tender, no rebound tenderness or guarding, no masses palpable, no organomegaly, +BS, no bruits  Musculoskeletal: ROM intact, no joint swelling, normal strength, no spinal tenderness  Extremities: normal extremities, no cyanosis edema, contusions or wounds, no clubbing  Neurological: alert and oriented x4, intact senses, motor, response and reflexes, normal strength  Breast: S/p Left mastectomy without reconstruction. No palpable masses or lesions in right breast or left chest wall. Baseline hypopigmentation across the left chest wall.   Lymphatic: No cervical, supraclavicular,  infraclavicular or axillary lymphadenopathy  Psychological: Appropriate and talkative mood and behavior  Skin: Warm and dry, no lesions, no rashes, no jaundice      Diagnostic Results   === 12/27/24 ===    CT CHEST ABDOMEN PELVIS W IV CONTRAST    - Impression -  [facility] breast cancer restaging scan, in comparison to prior CT from  October 2024:    1. Tumor burden has improved with continued decrease in size and  density of the left parasternal chest wall lesion. Similar appearance  of the sclerotic lesion in the sternum. No new site of disease is  evident.  2. Stable mildly prominent right axillary lymph node, nonenlarged by  size criteria, which bears watching on future examinations.  3. Stable pulmonary nodules measuring up to 8 mm, which also bears  watching on future examinations.      I personally reviewed the images/study and I agree with the findings  as stated by Juanita Jacobs MD. This study was interpreted at  [place], [place].    MACRO:  None    Signed by: Kirill Brar 12/29/2024 10:02 AM  Dictation workstation:   [id number]      Interpreted By:  Robert Luis,   STUDY:  DEXA BONE DENSITY9/3/2024 2:40 pm      INDICATION:  Signs/Symptoms:evaluate for osteopenia given she's on anti-estrogen  therapy.  The patient is a 65 y/o  year old F.      ,C50.912 Malignant neoplasm of unspecified site of left female breast  (Multi),Z17.0 Estrogen receptor positive status (ER+)      COMPARISON:  None.      ACCESSION NUMBER(S):  [id number]      ORDERING CLINICIAN:  MOLINA HAAS      TECHNIQUE:  DEXA BONE DENSITY      FINDINGS:  SPINE L1-L4  Bone Mineral Density: 1.034  T-Score -1.2  Z-Score 0.2  Bone Mineral Density change vs baseline:  Not reported  Bone Mineral Density change vs previous: Not reported      LEFT FEMUR -TOTAL  Bone Mineral Density: 1.097  T-Score 0.7   Z-Score  1.8  Bone Mineral Density change vs baseline: Not reported  Bone Mineral Density change vs  previous: Not reported      LEFT FEMUR -NECK  Bone Mineral Density: 1.000  T-Score -0.3  Z-Score 1.1  Bone Mineral Density change vs baseline: Not reported  Bone Mineral Density change vs previous: Not reported      LEFT FOREARM  Total  Bone Mineral Density: Not reported  T-Score Not reported  Z-Score Not reported  UD  Bone Mineral Density: Not reported  T-Score Not reported Z-Score Not reported  MID  Bone Mineral Density: Not reported  T-Score Not reported Z-Score Not reported  1/3  Bone Mineral Density: Not reported  T-Score Not reported   Z-Score Not reported  Bone Mineral Density change vs baseline:  Not reported  Bone Mineral Density change vs previous:  Not reported      RIGHT FEMUR -TOTAL  Bone Mineral Density: Not reported  T-Score Not reported   Z-Score Not reported  Bone Mineral Density change vs baseline:  Not reported  Bone Mineral Density change vs previous: Not reported      RIGHT FEMUR -NECK  Bone Mineral Density: Not reported  T-Score Not reported   Z-Score  Not reported  Bone Mineral Density change vs baseline:  Bone Mineral Density change vs previous:      RIGHT FOREARM  Bone Mineral Density: Not reported  T-Score Not reported   Z-Score Not reported  UD  Bone Mineral Density: Not reported  T-Score Not reported  Z-Score Not reported  MID  Bone Mineral Density: Not reported  T-Score Not reported  Z-Score  Not reported  1/3  Bone Mineral Density: Not reported  T-Score Not reported  Z-Score Not reported  Bone Mineral Density change vs baseline:  Not reported  Bone Mineral Density change vs previous:  Not reported          World Health Organization (WHO) criteria for post-menopausal,   Women:  Normal:         T-score at or above -1 SD  Osteopenia:   T-score between -1 and -2.5 SD  Osteoporosis: T-score at or below -2.5 SD          10-year Fracture Risk:  Major Osteoporotic Fracture  6.5  Hip Fracture                        0.2      Note:  If no FRAX score is reported, it is because:  Some  T-score for Spine Total or Hip Total or Femoral Neck at or below  -2.5          IMPRESSION:  DEXA:  According to World Health Organization criteria,  classification is low bone mass (osteopenia)      Followup recommended in two years or sooner as clinically warranted.      LABORATORY/PATHOLOGY DATA    Lab on 12/27/2024   Component Date Value Ref Range Status    CA 27.29 12/27/2024 40.9 (H)  0.0 - 38.6 U/mL Final    WBC 12/27/2024 1.7 (L)  4.4 - 11.3 x10*3/uL Final    nRBC 12/27/2024 0.0  0.0 - 0.0 /100 WBCs Final    RBC 12/27/2024 3.28 (L)  4.00 - 5.20 x10*6/uL Final    Hemoglobin 12/27/2024 11.4 (L)  12.0 - 16.0 g/dL Final    Hematocrit 12/27/2024 33.4 (L)  36.0 - 46.0 % Final    MCV 12/27/2024 102 (H)  80 - 100 fL Final    MCH 12/27/2024 34.8 (H)  26.0 - 34.0 pg Final    MCHC 12/27/2024 34.1  32.0 - 36.0 g/dL Final    RDW 12/27/2024 17.3 (H)  11.5 - 14.5 % Final    Platelets 12/27/2024 184  150 - 450 x10*3/uL Final    Immature Granulocytes %, Automated 12/27/2024 0.6  0.0 - 0.9 % Final    Immature Granulocytes Absolute, Au* 12/27/2024 0.01  0.00 - 0.70 x10*3/uL Final    Glucose 12/27/2024 97  74 - 99 mg/dL Final    Sodium 12/27/2024 139  136 - 145 mmol/L Final    Potassium 12/27/2024 3.9  3.5 - 5.3 mmol/L Final    Chloride 12/27/2024 102  98 - 107 mmol/L Final    Bicarbonate 12/27/2024 31  21 - 32 mmol/L Final    Anion Gap 12/27/2024 10  10 - 20 mmol/L Final    Urea Nitrogen 12/27/2024 16  6 - 23 mg/dL Final    Creatinine 12/27/2024 1.21 (H)  0.50 - 1.05 mg/dL Final    eGFR 12/27/2024 50 (L)  >60 mL/min/1.73m*2 Final    Calcium 12/27/2024 9.0  8.6 - 10.3 mg/dL Final    Albumin 12/27/2024 4.1  3.4 - 5.0 g/dL Final    Alkaline Phosphatase 12/27/2024 96  33 - 136 U/L Final    Total Protein 12/27/2024 6.8  6.4 - 8.2 g/dL Final    AST 12/27/2024 16  9 - 39 U/L Final    Bilirubin, Total 12/27/2024 0.5  0.0 - 1.2 mg/dL Final    ALT 12/27/2024 18  7 - 45 U/L Final    Neutrophils %, Manual 12/27/2024 53.0  40.0 - 80.0 %  Final    Lymphocytes %, Manual 2024 32.0  13.0 - 44.0 % Final    Monocytes %, Manual 2024 14.0  2.0 - 10.0 % Final    Eosinophils %, Manual 2024 1.0  0.0 - 6.0 % Final    Basophils %, Manual 2024 0.0  0.0 - 2.0 % Final    Seg Neutrophils Absolute, Manual 2024 0.90 (L)  1.20 - 7.00 x10*3/uL Final    Lymphocytes Absolute, Manual 2024 0.54 (L)  1.20 - 4.80 x10*3/uL Final    Monocytes Absolute, Manual 2024 0.24  0.10 - 1.00 x10*3/uL Final    Eosinophils Absolute, Manual 2024 0.02  0.00 - 0.70 x10*3/uL Final    Basophils Absolute, Manual 2024 0.00  0.00 - 0.10 x10*3/uL Final    Total Cells Counted 2024 100   Final    RBC Morphology 2024 No significant RBC morphology present   Final      Lab Results   Component Value Date    LABCA2 40.9 (H) 2024    LABCA2 36.5 2024    LABCA2 36.3 07/10/2024    LABCA2 39.3 (H) 2024    LABCA2 44.3 (H) 2024       FINAL DIAGNOSIS   A. Left chest wall mass, 10:00, ultrasound guided core needle biopsy:  -- Invasive ductal carcinoma, grade 3 involving fibrous tissue and skeletal muscle, see note.     Note:  In this limited sample, the invasive carcinoma measures up to 12 mm in greatest dimension.  ER, NM and HER2 will be reported in an addendum.      Electronically signed by Gabby Mcleod MD on 2024 at [address] [place] [address]   By the signature on this report, the individual or group listed as making the Final Interpretation/Diagnosis certifies that they have reviewed this case.    Addendum   Surgical/Block Number:        [id number]  Specimen Site:         Left chest wall mass  Specimen Type:       core needle biopsy        Estrogen Receptor (clone SP1):                     Positive                                                                          Percentage with nuclear stainin-95%                                                                          Intensity of staining: Moderate to  Strong     Progesterone Receptor (clone SP2): Positive                                                                         Percentage with nuclear stainin-40%                                                                          Intensity of staining: Moderate to Strong     HER2 (clone 4B5):                                                                                                                    Equivocal (2+).  JESSICA is pending and will be reported in addendum.                                                                            Comment:         Addendum electronically signed by Gabby Mcleod MD on 2024 at 1014   Addendum   HER2 DUAL JESSICA FOR DETECTION OF HER2 GENE AMPLIFICATION  HER2 Dual JESSICA DNA Probe Cocktail Assay from [employer] ([employer])    Results are expressed as the averaged ratio HER2/chromosome 17 signals in 20 nuclei.        Source of Specimen: Left chest wall mass biopsy  Paraffin Block: [id number]  Duration of fixation:  unless otherwise noted, 6-72 hours fixation     TEST RESULTS:    Number of tumor cells counted:  20  Number of observers: 1  Average number of HER2 signals/nucleus:  2.6  Average number of CEP 17 signals/nucleus: 2.1  Ratio of average HER2/CEP 17:  1.2     INTERPRETATION:       Negative (Not amplified)         IMPRESSION/PLAN    Recurrent oligometastatic ER+/TN+/HER2 2+ breast cancer with chest wall/sternal mass and left axillary and intrathoracic nodes.   Continue present therapy Dec 2024 repeated CT scan with continued treatment response.   Follow-up with Dr Keshav Camarena 3 month recall repeat CT scan-  Plan will be once she has maximal response to send back to Dr Damon for radiation therapy- she is agreeable to this plan is hopeful for radiation.       2. Bone Health   2024 Updated DEXA T-score -1.2, mild osteopenia.  Monitor for now as discussed with Dr Keshav Camarena        At least 25 minutes of direct consultation was  spent with the patient today reviewing her cancer care plan, educating and answering questions regarding ongoing follow up, greater than 50% in counseling and coordination of care      Thank you for the opportunity to be involved your care.   We discussed the clinical significance of diagnosis, goals of care and treatment plan in detail.   Please do not hesitate to reach out with any questions.           -------------------------------------------------------------------------------------------------------------------------------  Alize Hernandez MSN, APRN, FNP-C  [facility]  Division of Medical Oncology- Breast   Collaborating Physician Dr. Keshav Camarena   [facility] [place] [address]  Phone: [phone]  Fax: [phone]  Available via Secure Chat    Confidential Peer Review Document-  Privilege  Privileged Pursuant to [place] Revised Code Section 2305.24, .25, .251 & .252

## 2025-01-13 ENCOUNTER — TELEPHONE (OUTPATIENT)
Dept: HEMATOLOGY/ONCOLOGY | Facility: HOSPITAL | Age: 65
End: 2025-01-13
Payer: COMMERCIAL

## 2025-01-28 NOTE — PROGRESS NOTES
"KHALIDA ( Breast and Pelvic exam )    Last pap: HYST   Last mammo: 1.8.25 ( NEG )  Last Colonoscopy: Per pt 2018 - 2028    Chaperone: Shanell Chris, CMA3      Christine Malik is a 64 y.o. female who is here for a routine exam. PCP = Octaviano Mares MD    HPI: In May she had a chest wall occurrence on the sterunm. It is getting smaller in treatment.  Another CT scan in May. She is doing well on the meds- just causes fatigue.  Working at home now new job and they are so supportive.  [employer]- started by an [employer] man- Seeo.     Gyn: hyst 1998  Sexual concerns: not currently sexually active, no concerns  STI: none  Social hx: , 4 grand daughters two grand .  Two daughters- one in last year of vet school in [place] and oldest in [place] my patient Graciela, and oldest son lives in [facility].   Seatbelt: always  Exercise: 3-4x week, walking 40 minutes, swimming 2x a week.  Sexual, physical, mental abuse: safe  PMH: Breast Ca- mastectomy with chemo- chest wall recurrence, AI, cHTN  Dr. Mares is PCP, Dr. Boone is oncology.       Objective   /82   Ht 1.676 m (5' 6\")   Wt 88 kg (194 lb)   BMI 31.31 kg/m²   General: Alert and oriented, in no acute distress  HEENT: nl thyroid  Abd: soft NT  Breast exam: mastectomy left  External genitalia: Normal architecture without erythema, masses, or lesions.   Urethra and urethral meatus:  normal   Bladder: no tenderness to palpation  Vagina: mucosa without lesions or masses. No abnormal vaginal discharge.   Cervix: surg absent  Uterus: absent  Adnexa/parametria: Normal without masses  Anus and perineum: Normal external inspection of kavya-anus and external anus  Psych: Normal affect. Normal mood.       Annual  No pap hyst  mammogram done by breast  Colonoscopy- done in 2018 per pt, negative   Under treatment for chest wall recurrence.  -DEXA: Had dexa in 2019 FH of osteoporosis  09/03/24-nl    No orders of the defined types were placed in " this encounter.      Problem List Items Addressed This Visit    None       Thank you for coming to your annual exam.

## 2025-01-29 ENCOUNTER — APPOINTMENT (OUTPATIENT)
Dept: OBSTETRICS AND GYNECOLOGY | Facility: CLINIC | Age: 65
End: 2025-01-29
Payer: COMMERCIAL

## 2025-01-29 VITALS
HEIGHT: 66 IN | DIASTOLIC BLOOD PRESSURE: 82 MMHG | WEIGHT: 194 LBS | SYSTOLIC BLOOD PRESSURE: 136 MMHG | BODY MASS INDEX: 31.18 KG/M2

## 2025-01-29 DIAGNOSIS — Z01.419 WELL WOMAN EXAM WITH ROUTINE GYNECOLOGICAL EXAM: Primary | ICD-10-CM

## 2025-01-29 PROCEDURE — 3008F BODY MASS INDEX DOCD: CPT | Performed by: OBSTETRICS & GYNECOLOGY

## 2025-01-29 PROCEDURE — 99396 PREV VISIT EST AGE 40-64: CPT | Performed by: OBSTETRICS & GYNECOLOGY

## 2025-01-29 PROCEDURE — 1036F TOBACCO NON-USER: CPT | Performed by: OBSTETRICS & GYNECOLOGY

## 2025-01-29 ASSESSMENT — PAIN SCALES - GENERAL: PAINLEVEL_OUTOF10: 0-NO PAIN

## 2025-03-13 DIAGNOSIS — C50.912 CHEST WALL RECURRENCE OF BREAST CANCER, LEFT (MULTI): Primary | ICD-10-CM

## 2025-03-13 DIAGNOSIS — C79.89 CHEST WALL RECURRENCE OF BREAST CANCER, LEFT (MULTI): Primary | ICD-10-CM

## 2025-04-23 DIAGNOSIS — C50.912 MALIGNANT NEOPLASM OF LEFT BREAST IN FEMALE, ESTROGEN RECEPTOR POSITIVE, UNSPECIFIED SITE OF BREAST: ICD-10-CM

## 2025-04-23 DIAGNOSIS — Z17.0 MALIGNANT NEOPLASM OF LEFT BREAST IN FEMALE, ESTROGEN RECEPTOR POSITIVE, UNSPECIFIED SITE OF BREAST: ICD-10-CM

## 2025-04-29 ENCOUNTER — APPOINTMENT (OUTPATIENT)
Dept: HEMATOLOGY/ONCOLOGY | Facility: CLINIC | Age: 65
End: 2025-04-29
Payer: COMMERCIAL

## 2025-05-07 ENCOUNTER — LAB (OUTPATIENT)
Dept: LAB | Facility: HOSPITAL | Age: 65
End: 2025-05-07
Payer: COMMERCIAL

## 2025-05-07 ENCOUNTER — HOSPITAL ENCOUNTER (OUTPATIENT)
Dept: RADIOLOGY | Facility: HOSPITAL | Age: 65
Discharge: HOME | End: 2025-05-07
Payer: COMMERCIAL

## 2025-05-07 DIAGNOSIS — T45.1X5A CHEMOTHERAPY-INDUCED FATIGUE: ICD-10-CM

## 2025-05-07 DIAGNOSIS — C50.912 CHEST WALL RECURRENCE OF BREAST CANCER, LEFT: ICD-10-CM

## 2025-05-07 DIAGNOSIS — Z79.811 AROMATASE INHIBITOR USE: ICD-10-CM

## 2025-05-07 DIAGNOSIS — C79.89 CHEST WALL RECURRENCE OF BREAST CANCER, LEFT: ICD-10-CM

## 2025-05-07 DIAGNOSIS — Z08 ENCOUNTER FOR FOLLOW-UP EXAMINATION AFTER COMPLETED TREATMENT FOR MALIGNANT NEOPLASM: ICD-10-CM

## 2025-05-07 DIAGNOSIS — Z08 ENCOUNTER FOR FOLLOW-UP SURVEILLANCE OF BREAST CANCER: ICD-10-CM

## 2025-05-07 DIAGNOSIS — Z90.12 HISTORY OF LEFT MASTECTOMY: ICD-10-CM

## 2025-05-07 DIAGNOSIS — C50.912 MALIGNANT NEOPLASM OF LEFT BREAST IN FEMALE, ESTROGEN RECEPTOR POSITIVE, UNSPECIFIED SITE OF BREAST: ICD-10-CM

## 2025-05-07 DIAGNOSIS — R53.83 CHEMOTHERAPY-INDUCED FATIGUE: ICD-10-CM

## 2025-05-07 DIAGNOSIS — Z85.3 ENCOUNTER FOR FOLLOW-UP SURVEILLANCE OF BREAST CANCER: ICD-10-CM

## 2025-05-07 DIAGNOSIS — Z51.11 ENCOUNTER FOR ANTINEOPLASTIC CHEMOTHERAPY: ICD-10-CM

## 2025-05-07 DIAGNOSIS — Z90.12 ACQUIRED ABSENCE OF LEFT BREAST AND NIPPLE: ICD-10-CM

## 2025-05-07 DIAGNOSIS — R53.83 OTHER FATIGUE: ICD-10-CM

## 2025-05-07 DIAGNOSIS — Z79.811 LONG TERM (CURRENT) USE OF AROMATASE INHIBITORS: ICD-10-CM

## 2025-05-07 DIAGNOSIS — Z17.0 MALIGNANT NEOPLASM OF LEFT BREAST IN FEMALE, ESTROGEN RECEPTOR POSITIVE, UNSPECIFIED SITE OF BREAST: ICD-10-CM

## 2025-05-07 DIAGNOSIS — C50.912 MALIGNANT NEOPLASM OF UNSPECIFIED SITE OF LEFT FEMALE BREAST: ICD-10-CM

## 2025-05-07 DIAGNOSIS — Z51.11 ENCOUNTER FOR ANTINEOPLASTIC CHEMOTHERAPY: Primary | ICD-10-CM

## 2025-05-07 LAB
ALBUMIN SERPL BCP-MCNC: 3.9 G/DL (ref 3.4–5)
ALP SERPL-CCNC: 99 U/L (ref 33–136)
ALT SERPL W P-5'-P-CCNC: 18 U/L (ref 7–45)
ANION GAP SERPL CALC-SCNC: 10 MMOL/L (ref 10–20)
AST SERPL W P-5'-P-CCNC: 20 U/L (ref 9–39)
BASOPHILS # BLD MANUAL: 0 X10*3/UL (ref 0–0.1)
BASOPHILS NFR BLD MANUAL: 0 %
BILIRUB SERPL-MCNC: 0.6 MG/DL (ref 0–1.2)
BUN SERPL-MCNC: 14 MG/DL (ref 6–23)
CALCIUM SERPL-MCNC: 8.7 MG/DL (ref 8.6–10.3)
CANCER AG27-29 SERPL-ACNC: 49.9 U/ML (ref 0–38.6)
CHLORIDE SERPL-SCNC: 101 MMOL/L (ref 98–107)
CO2 SERPL-SCNC: 30 MMOL/L (ref 21–32)
CREAT SERPL-MCNC: 0.86 MG/DL (ref 0.6–1.3)
CREAT SERPL-MCNC: 1.14 MG/DL (ref 0.5–1.05)
EGFRCR SERPLBLD CKD-EPI 2021: 54 ML/MIN/1.73M*2
EOSINOPHIL # BLD MANUAL: 0.01 X10*3/UL (ref 0–0.7)
EOSINOPHIL NFR BLD MANUAL: 1 %
ERYTHROCYTE [DISTWIDTH] IN BLOOD BY AUTOMATED COUNT: 17.2 % (ref 11.5–14.5)
GFR SERPL CREATININE-BSD FRML MDRD: 76 ML/MIN/1.73M*2
GLUCOSE SERPL-MCNC: 107 MG/DL (ref 74–99)
HCT VFR BLD AUTO: 29.7 % (ref 36–46)
HGB BLD-MCNC: 9.9 G/DL (ref 12–16)
IMM GRANULOCYTES # BLD AUTO: 0.01 X10*3/UL (ref 0–0.7)
IMM GRANULOCYTES NFR BLD AUTO: 0.7 % (ref 0–0.9)
LYMPHOCYTES # BLD MANUAL: 0.46 X10*3/UL (ref 1.2–4.8)
LYMPHOCYTES NFR BLD MANUAL: 33 %
MCH RBC QN AUTO: 33.8 PG (ref 26–34)
MCHC RBC AUTO-ENTMCNC: 33.3 G/DL (ref 32–36)
MCV RBC AUTO: 101 FL (ref 80–100)
MONOCYTES # BLD MANUAL: 0.1 X10*3/UL (ref 0.1–1)
MONOCYTES NFR BLD MANUAL: 7 %
NEUTS SEG # BLD MANUAL: 0.83 X10*3/UL (ref 1.2–7)
NEUTS SEG NFR BLD MANUAL: 59 %
NRBC BLD-RTO: 0 /100 WBCS (ref 0–0)
OVALOCYTES BLD QL SMEAR: ABNORMAL
PLATELET # BLD AUTO: 182 X10*3/UL (ref 150–450)
POTASSIUM SERPL-SCNC: 3.6 MMOL/L (ref 3.5–5.3)
PROT SERPL-MCNC: 6.9 G/DL (ref 6.4–8.2)
RBC # BLD AUTO: 2.93 X10*6/UL (ref 4–5.2)
RBC MORPH BLD: ABNORMAL
SODIUM SERPL-SCNC: 137 MMOL/L (ref 136–145)
TOTAL CELLS COUNTED BLD: 100
WBC # BLD AUTO: 1.4 X10*3/UL (ref 4.4–11.3)

## 2025-05-07 PROCEDURE — 74177 CT ABD & PELVIS W/CONTRAST: CPT

## 2025-05-07 PROCEDURE — 86300 IMMUNOASSAY TUMOR CA 15-3: CPT

## 2025-05-07 PROCEDURE — 82565 ASSAY OF CREATININE: CPT

## 2025-05-07 PROCEDURE — 85007 BL SMEAR W/DIFF WBC COUNT: CPT

## 2025-05-07 PROCEDURE — 85027 COMPLETE CBC AUTOMATED: CPT

## 2025-05-07 PROCEDURE — 2550000001 HC RX 255 CONTRASTS: Mod: JZ | Performed by: NURSE PRACTITIONER

## 2025-05-07 PROCEDURE — 36415 COLL VENOUS BLD VENIPUNCTURE: CPT

## 2025-05-07 RX ADMIN — IOHEXOL 75 ML: 350 INJECTION, SOLUTION INTRAVENOUS at 09:28

## 2025-05-08 ENCOUNTER — TELEPHONE (OUTPATIENT)
Dept: HEMATOLOGY/ONCOLOGY | Facility: HOSPITAL | Age: 65
End: 2025-05-08
Payer: COMMERCIAL

## 2025-05-08 NOTE — TELEPHONE ENCOUNTER
VM to Christine to review scan as discussed with Dr Keshav Camarena - is considered stable. Will still proceed with visit as scheduled on 5/14/25

## 2025-05-13 DIAGNOSIS — Z17.0 MALIGNANT NEOPLASM OF LEFT BREAST IN FEMALE, ESTROGEN RECEPTOR POSITIVE, UNSPECIFIED SITE OF BREAST: ICD-10-CM

## 2025-05-13 DIAGNOSIS — C50.912 MALIGNANT NEOPLASM OF LEFT BREAST IN FEMALE, ESTROGEN RECEPTOR POSITIVE, UNSPECIFIED SITE OF BREAST: ICD-10-CM

## 2025-05-13 RX ORDER — EXEMESTANE 25 MG/1
25 TABLET ORAL DAILY
Qty: 90 TABLET | Refills: 3 | Status: SHIPPED | OUTPATIENT
Start: 2025-05-13 | End: 2026-05-13

## 2025-05-14 ENCOUNTER — HOSPITAL ENCOUNTER (OUTPATIENT)
Dept: CARDIOLOGY | Facility: HOSPITAL | Age: 65
Discharge: HOME | End: 2025-05-14
Payer: COMMERCIAL

## 2025-05-14 ENCOUNTER — OFFICE VISIT (OUTPATIENT)
Dept: HEMATOLOGY/ONCOLOGY | Facility: CLINIC | Age: 65
End: 2025-05-14
Payer: COMMERCIAL

## 2025-05-14 ENCOUNTER — ONCOLOGY MEDICATION OUTREACH (OUTPATIENT)
Dept: HEMATOLOGY/ONCOLOGY | Facility: CLINIC | Age: 65
End: 2025-05-14
Payer: COMMERCIAL

## 2025-05-14 ENCOUNTER — SOCIAL WORK (OUTPATIENT)
Dept: CASE MANAGEMENT | Facility: HOSPITAL | Age: 65
End: 2025-05-14
Payer: COMMERCIAL

## 2025-05-14 VITALS
WEIGHT: 194.34 LBS | HEIGHT: 65 IN | OXYGEN SATURATION: 97 % | HEART RATE: 80 BPM | SYSTOLIC BLOOD PRESSURE: 149 MMHG | TEMPERATURE: 97.4 F | DIASTOLIC BLOOD PRESSURE: 87 MMHG | RESPIRATION RATE: 16 BRPM | BODY MASS INDEX: 32.38 KG/M2

## 2025-05-14 DIAGNOSIS — Z90.12 HISTORY OF LEFT MASTECTOMY: ICD-10-CM

## 2025-05-14 DIAGNOSIS — T45.1X5A CHEMOTHERAPY-INDUCED FATIGUE: ICD-10-CM

## 2025-05-14 DIAGNOSIS — Z79.811 AROMATASE INHIBITOR USE: ICD-10-CM

## 2025-05-14 DIAGNOSIS — C79.89 CHEST WALL RECURRENCE OF BREAST CANCER, LEFT: ICD-10-CM

## 2025-05-14 DIAGNOSIS — Z51.11 ENCOUNTER FOR ANTINEOPLASTIC CHEMOTHERAPY: ICD-10-CM

## 2025-05-14 DIAGNOSIS — C50.912 CHEST WALL RECURRENCE OF BREAST CANCER, LEFT: ICD-10-CM

## 2025-05-14 DIAGNOSIS — Z85.3 ENCOUNTER FOR FOLLOW-UP SURVEILLANCE OF BREAST CANCER: ICD-10-CM

## 2025-05-14 DIAGNOSIS — Z17.0 MALIGNANT NEOPLASM OF LEFT BREAST IN FEMALE, ESTROGEN RECEPTOR POSITIVE, UNSPECIFIED SITE OF BREAST: ICD-10-CM

## 2025-05-14 DIAGNOSIS — R53.83 CHEMOTHERAPY-INDUCED FATIGUE: ICD-10-CM

## 2025-05-14 DIAGNOSIS — Z08 ENCOUNTER FOR FOLLOW-UP SURVEILLANCE OF BREAST CANCER: ICD-10-CM

## 2025-05-14 DIAGNOSIS — C50.912 MALIGNANT NEOPLASM OF LEFT BREAST IN FEMALE, ESTROGEN RECEPTOR POSITIVE, UNSPECIFIED SITE OF BREAST: ICD-10-CM

## 2025-05-14 PROCEDURE — 99215 OFFICE O/P EST HI 40 MIN: CPT | Performed by: INTERNAL MEDICINE

## 2025-05-14 PROCEDURE — 93005 ELECTROCARDIOGRAM TRACING: CPT

## 2025-05-14 ASSESSMENT — PAIN SCALES - GENERAL: PAINLEVEL_OUTOF10: 0-NO PAIN

## 2025-05-14 NOTE — PATIENT INSTRUCTIONS
Please call us at [phone] then follow the prompts, 1 for scheduling or 5 and then 2 for the nurse triage line.   You may also contact your nurse or doctor with non-urgent issues by sending a Octonotcot message.  Reminders  Medicine refills: call or send a Tran[employer]finity message to request medicine refills at least 5 to 7 days before you run out.  PET CT to be done in 4 months and labs  Follow up with  Alize Hernandez CNP 1 week after your PET CT  Would do an EKG today

## 2025-05-14 NOTE — PROGRESS NOTES
Social Work Note    Referral Source: Dr. Camarena  Meeting Location: In-Person  Person(s) Present: Patient, Spouse and MICHELLE Celeste RN  Identified Needs: Kisqali financial assistance   Impression and Plan: SW receive referral from Dr. Camarena asking about when patient retires will she have financial assistance for Kisqali. TAWANA and RN met with patient and spouse today at [facility]. SW reviewed the Medicare out of pocket with Medicare part D as $2000 per year. SW also reviewed option for payment plans. SW reviewed options and programs through specialty pharmacy re: assistance. SW called patient's specialty pharmacy [facility] [phone]. SW was informed there are several foundations and  programs that can help with the cost for Medicare patients. SW called patient later in the day to update. SE reassured her she will have assistance through [facility]. Patient has a Medicare expert that help her get on the right plan that her spouse used. Patient is thinking about retiring in March when she turns 66.  Interventions Provided: Care Coordination and Education  Estimated Time Spent: 20    SW will continue to follow as needed.

## 2025-05-15 LAB
ATRIAL RATE: 78 BPM
P AXIS: 57 DEGREES
P OFFSET: 193 MS
P ONSET: 138 MS
PR INTERVAL: 168 MS
Q ONSET: 222 MS
QRS COUNT: 13 BEATS
QRS DURATION: 86 MS
QT INTERVAL: 394 MS
QTC CALCULATION(BAZETT): 449 MS
QTC FREDERICIA: 430 MS
R AXIS: 38 DEGREES
T AXIS: 71 DEGREES
T OFFSET: 419 MS
VENTRICULAR RATE: 78 BPM

## 2025-05-15 ASSESSMENT — ENCOUNTER SYMPTOMS
ARTHRALGIAS: 0
DEPRESSION: 0
BRUISES/BLEEDS EASILY: 0
CONSTIPATION: 0
COUGH: 0
CHILLS: 0
NUMBNESS: 0
SLEEP DISTURBANCE: 0
NAUSEA: 0
EXTREMITY WEAKNESS: 0
SCLERAL ICTERUS: 0
PALPITATIONS: 0
HEADACHES: 0
MYALGIAS: 0
DIZZINESS: 0
DIAPHORESIS: 0
SEIZURES: 0
DIFFICULTY URINATING: 0
FATIGUE: 1
LEG SWELLING: 0
HOT FLASHES: 0
CARDIOVASCULAR NEGATIVE: 1
WHEEZING: 0
FEVER: 0
HEMOPTYSIS: 0
NERVOUS/ANXIOUS: 0
SHORTNESS OF BREATH: 0
CHEST TIGHTNESS: 0
BLOOD IN STOOL: 0
DYSURIA: 0
ABDOMINAL DISTENTION: 0
CONFUSION: 0
EYE PROBLEMS: 0
BACK PAIN: 0
ADENOPATHY: 0
FREQUENCY: 0
EYES NEGATIVE: 1
ABDOMINAL PAIN: 0
HEMATURIA: 0
DIARRHEA: 0
RESPIRATORY NEGATIVE: 1
APPETITE CHANGE: 0

## 2025-05-15 NOTE — PROGRESS NOTES
Breast Medical Oncology Clinic  Location: [facility]      BREAST CANCER DIAGNOSIS  Breast cancer, left, Clinical: Stage IIA (cT2, cN0, cM0, G3, ER+, WY+, HER2-)   PIK3CA mutation [id number].    ONCOLOGIC HISTORY  Treatment Synopsis:    History of left-sided invasive ductal carcinoma, stage  IIA (T2 N0 M0). The patient's breast cancer was diagnosed on October 20, 2016, and is estrogen receptor positive at 95%, progesterone receptor positive at 90%, and HER-2/renae 2+ by IHC and not amplified  by FISH at 1.3.      In addition patient has an oncotype DX recurrence score of 31 translating to a 10-year distant recurrence rate of 21%.      Patient has completed a left mastectomy without reconstruction with SLNB on 11/11/2016. Pathology showed a 2.2cm mass, grade 3, with 0/7 LNs involved.     10/31/2016 genetic testing negative     4/2017 Initiated on Anastrazole     10/2018 changed to letrozole for intolerable side effects with anastrazole.     4/2022 Completed 5 year course antiestrogen therapy               Treatment History:       2016-11-11: Cancer Related Surgery:      Completed left mastectomy  2016-12-28: Chemotherapy:        Received cycle # of Taxotere and Cyclophosphamide  2017-3-1: Chemotherapy:            Completed 4 cycles of TC from 12/18/16 through to 03/01/2017 2017-4-12: New Treatment Plan:              Started arimidex  2018-10-26: New Treatment Plan:            Letrozole 2.5mg po daily started. Arimidex discontinued due to arthralgia.     CURRENT THERAPY  Exemestane/ribociclib since July 2024    HISTORY OF PRESENT ILLNESS    Christine Malik is a 65 y.o. woman here for follow-up. She was recently diagnosed with oligometastatic ER+ breast cancer. She had a recent left chest wall mass which revealed recurrent breast cancer grade 3 ER+ 90%/WY+ 30%/HER2 2+ but FISH negative. Had been on letrozole for about 5 yrs completed 4/2022. Tolerating treatment well. No issues. Last CT scans from this month showed continued  response.            Review of Systems   Constitutional:  Positive for fatigue. Negative for appetite change, chills, diaphoresis and fever.   HENT:  Negative.  Negative for hearing loss and lump/mass.    Eyes: Negative.  Negative for eye problems and icterus.   Respiratory: Negative.  Negative for chest tightness, cough, hemoptysis, shortness of breath and wheezing.    Cardiovascular: Negative.  Negative for chest pain, leg swelling and palpitations.   Gastrointestinal:  Negative for abdominal distention, abdominal pain, blood in stool, constipation, diarrhea and nausea.   Endocrine: Negative for hot flashes.   Genitourinary:  Negative for bladder incontinence, difficulty urinating, dyspareunia, dysuria, frequency and hematuria.    Musculoskeletal:  Negative for arthralgias, back pain, gait problem and myalgias.   Neurological:  Negative for dizziness, extremity weakness, gait problem, headaches, numbness and seizures.   Hematological:  Negative for adenopathy. Does not bruise/bleed easily.   Psychiatric/Behavioral:  Negative for confusion, depression and sleep disturbance. The patient is not nervous/anxious.    All other systems reviewed and are negative.        Past Medical History:  has a past medical history of Anxiety disorder, unspecified (2015), Personal history of malignant neoplasm of breast (2016), Personal history of other diseases of the circulatory system (2015), and Unspecified lump in the left breast, unspecified quadrant (10/20/2016).  Surgical History:   has a past surgical history that includes Dilation and curettage of uterus (2014); Hysterectomy (2014);  section, classic (2014); Other surgical history (2014); Hiller lymph node biopsy (2016); and Mastectomy (Left).  Social History:   reports that she has never smoked. She has never used smokeless tobacco. She reports that she does not drink alcohol and does not use drugs.  Family History:   "  Family History   Problem Relation Name Age of Onset    Breast cancer Mother  40     Family Oncology History:  Cancer-related family history includes Breast cancer (age of onset: 40) in her mother.      OBJECTIVE    VS / Pain:  /87 (BP Location: Right arm, Patient Position: Sitting, BP Cuff Size: Large adult)   Pulse 80   Temp 36.3 °C (97.4 °F) (Temporal)   Resp 16   Ht 1.658 m (5' 5.28\")   Wt 88.2 kg (194 lb 5.4 oz)   SpO2 97%   BMI 32.07 kg/m²   BSA: 2.02 meters squared   Pain Scale: 0    Performance Status:  The ECOG performance scale today is ECO- Fully active, able to carry on all pre-disease performance w/o restriction.    Physical Exam  Constitutional:       General: She is not in acute distress.     Appearance: She is not ill-appearing, toxic-appearing or diaphoretic.   HENT:      Nose: No congestion or rhinorrhea.      Mouth/Throat:      Pharynx: No posterior oropharyngeal erythema.   Cardiovascular:      Rate and Rhythm: Normal rate and regular rhythm.      Pulses: Normal pulses.      Heart sounds: Normal heart sounds. No murmur heard.     No gallop.   Pulmonary:      Breath sounds: Normal breath sounds.   Abdominal:      General: There is no distension.      Palpations: There is no mass.      Tenderness: There is no abdominal tenderness.   Musculoskeletal:         General: No swelling.      Cervical back: No rigidity.      Right lower leg: No edema.      Left lower leg: No edema.   Lymphadenopathy:      Cervical: No cervical adenopathy.   Skin:     General: Skin is warm.      Coloration: Skin is not cyanotic.      Findings: No bruising, ecchymosis or erythema.   Neurological:      General: No focal deficit present.      Mental Status: She is oriented to person, place, and time.      Cranial Nerves: Cranial nerves 2-12 are intact.      Motor: Motor function is intact.   Psychiatric:         Attention and Perception: Attention and perception normal.         Mood and Affect: Mood and affect " normal.         Behavior: Behavior normal.         Thought Content: Thought content normal.         Judgment: Judgment normal.           Diagnostic Results   === 05/07/25 ===    CT CHEST ABDOMEN PELVIS W IV CONTRAST    - Impression -  Similar appearance of left parasternal chest wall lesion along the  internal mammary vessels measuring up to 11 mm in thickness (series  2, images 43-48); also similar appearance of adjacent sclerosis in  the superior body of sternum.    Interval slight decrease in size of several bilateral pulmonary  nodules detailed above, suggesting interval response to treatment.    No evidence of new metastatic disease in the chest, abdomen or pelvis.    Left mastectomy and hysterectomy.    Mild hepatomegaly.    Cholelithiasis.    Coronary artery atherosclerosis.    MACRO:  None    Signed by: Vamshi Kaur 5/8/2025 11:48 AM  Dictation workstation:   [id number]     Interpreted By:  Robert Luis,   STUDY:  DEXA BONE DENSITY9/3/2024 2:40 pm      INDICATION:  Signs/Symptoms:evaluate for osteopenia given she's on anti-estrogen  therapy.  The patient is a 63 y/o  year old F.      ,C50.912 Malignant neoplasm of unspecified site of left female breast  (Multi),Z17.0 Estrogen receptor positive status (ER+)      COMPARISON:  None.      ACCESSION NUMBER(S):  [id number]      ORDERING CLINICIAN:  MOLINA HAAS      TECHNIQUE:  DEXA BONE DENSITY      FINDINGS:  SPINE L1-L4  Bone Mineral Density: 1.034  T-Score -1.2  Z-Score 0.2  Bone Mineral Density change vs baseline:  Not reported  Bone Mineral Density change vs previous: Not reported      LEFT FEMUR -TOTAL  Bone Mineral Density: 1.097  T-Score 0.7   Z-Score  1.8  Bone Mineral Density change vs baseline: Not reported  Bone Mineral Density change vs previous: Not reported      LEFT FEMUR -NECK  Bone Mineral Density: 1.000  T-Score -0.3  Z-Score 1.1  Bone Mineral Density change vs baseline: Not reported  Bone Mineral Density change vs previous: Not  reported      LEFT FOREARM  Total  Bone Mineral Density: Not reported  T-Score Not reported  Z-Score Not reported  UD  Bone Mineral Density: Not reported  T-Score Not reported Z-Score Not reported  MID  Bone Mineral Density: Not reported  T-Score Not reported Z-Score Not reported  1/3  Bone Mineral Density: Not reported  T-Score Not reported   Z-Score Not reported  Bone Mineral Density change vs baseline:  Not reported  Bone Mineral Density change vs previous:  Not reported      RIGHT FEMUR -TOTAL  Bone Mineral Density: Not reported  T-Score Not reported   Z-Score Not reported  Bone Mineral Density change vs baseline:  Not reported  Bone Mineral Density change vs previous: Not reported      RIGHT FEMUR -NECK  Bone Mineral Density: Not reported  T-Score Not reported   Z-Score  Not reported  Bone Mineral Density change vs baseline:  Bone Mineral Density change vs previous:      RIGHT FOREARM  Bone Mineral Density: Not reported  T-Score Not reported   Z-Score Not reported  UD  Bone Mineral Density: Not reported  T-Score Not reported  Z-Score Not reported  MID  Bone Mineral Density: Not reported  T-Score Not reported  Z-Score  Not reported  1/3  Bone Mineral Density: Not reported  T-Score Not reported  Z-Score Not reported  Bone Mineral Density change vs baseline:  Not reported  Bone Mineral Density change vs previous:  Not reported          World Health Organization (WHO) criteria for post-menopausal,   Women:  Normal:         T-score at or above -1 SD  Osteopenia:   T-score between -1 and -2.5 SD  Osteoporosis: T-score at or below -2.5 SD          10-year Fracture Risk:  Major Osteoporotic Fracture  6.5  Hip Fracture                        0.2      Note:  If no FRAX score is reported, it is because:  Some T-score for Spine Total or Hip Total or Femoral Neck at or below  -2.5          IMPRESSION:  DEXA:  According to World Health Organization criteria,  classification is low bone mass (osteopenia)       Followup recommended in two years or sooner as clinically warranted.      LABORATORY/PATHOLOGY DATA    Office Visit on 05/14/2025   Component Date Value Ref Range Status    Ventricular Rate 05/14/2025 78  BPM Preliminary    Atrial Rate 05/14/2025 78  BPM Preliminary    VT Interval 05/14/2025 168  ms Preliminary    QRS Duration 05/14/2025 86  ms Preliminary    QT Interval 05/14/2025 394  ms Preliminary    QTC Calculation(Bazett) 05/14/2025 449  ms Preliminary    P [place] 05/14/2025 57  degrees Preliminary    R Axis 05/14/2025 38  degrees Preliminary    T Axis 05/14/2025 71  degrees Preliminary    QRS Count 05/14/2025 13  beats Preliminary    Q Onset 05/14/2025 222  ms Preliminary    P Onset 05/14/2025 138  ms Preliminary    P Offset 05/14/2025 193  ms Preliminary    T Offset 05/14/2025 419  ms Preliminary    QTC Fredericia 05/14/2025 430  ms Preliminary   Hospital Outpatient Visit on 05/07/2025   Component Date Value Ref Range Status    POCT Creatinine 05/07/2025 0.86  0.60 - 1.30 mg/dL Final    POCT eGFR 05/07/2025 76  >=60 mL/min/1.73m*2 Final   Lab on 05/07/2025   Component Date Value Ref Range Status    CA 27.29 05/07/2025 49.9 (H)  0.0 - 38.6 U/mL Final    Glucose 05/07/2025 107 (H)  74 - 99 mg/dL Final    Sodium 05/07/2025 137  136 - 145 mmol/L Final    Potassium 05/07/2025 3.6  3.5 - 5.3 mmol/L Final    Chloride 05/07/2025 101  98 - 107 mmol/L Final    Bicarbonate 05/07/2025 30  21 - 32 mmol/L Final    Anion Gap 05/07/2025 10  10 - 20 mmol/L Final    Urea Nitrogen 05/07/2025 14  6 - 23 mg/dL Final    Creatinine 05/07/2025 1.14 (H)  0.50 - 1.05 mg/dL Final    eGFR 05/07/2025 54 (L)  >60 mL/min/1.73m*2 Final    Calcium 05/07/2025 8.7  8.6 - 10.3 mg/dL Final    Albumin 05/07/2025 3.9  3.4 - 5.0 g/dL Final    Alkaline Phosphatase 05/07/2025 99  33 - 136 U/L Final    Total Protein 05/07/2025 6.9  6.4 - 8.2 g/dL Final    AST 05/07/2025 20  9 - 39 U/L Final    Bilirubin, Total 05/07/2025 0.6  0.0 - 1.2 mg/dL Final     ALT 05/07/2025 18  7 - 45 U/L Final    WBC 05/07/2025 1.4 (L)  4.4 - 11.3 x10*3/uL Final    nRBC 05/07/2025 0.0  0.0 - 0.0 /100 WBCs Final    RBC 05/07/2025 2.93 (L)  4.00 - 5.20 x10*6/uL Final    Hemoglobin 05/07/2025 9.9 (L)  12.0 - 16.0 g/dL Final    Hematocrit 05/07/2025 29.7 (L)  36.0 - 46.0 % Final    MCV 05/07/2025 101 (H)  80 - 100 fL Final    MCH 05/07/2025 33.8  26.0 - 34.0 pg Final    MCHC 05/07/2025 33.3  32.0 - 36.0 g/dL Final    RDW 05/07/2025 17.2 (H)  11.5 - 14.5 % Final    Platelets 05/07/2025 182  150 - 450 x10*3/uL Final    Immature Granulocytes %, Automated 05/07/2025 0.7  0.0 - 0.9 % Final    Immature Granulocytes Absolute, Au* 05/07/2025 0.01  0.00 - 0.70 x10*3/uL Final    Neutrophils %, Manual 05/07/2025 59.0  40.0 - 80.0 % Final    Lymphocytes %, Manual 05/07/2025 33.0  13.0 - 44.0 % Final    Monocytes %, Manual 05/07/2025 7.0  2.0 - 10.0 % Final    Eosinophils %, Manual 05/07/2025 1.0  0.0 - 6.0 % Final    Basophils %, Manual 05/07/2025 0.0  0.0 - 2.0 % Final    Seg Neutrophils Absolute, Manual 05/07/2025 0.83 (L)  1.20 - 7.00 x10*3/uL Final    Lymphocytes Absolute, Manual 05/07/2025 0.46 (L)  1.20 - 4.80 x10*3/uL Final    Monocytes Absolute, Manual 05/07/2025 0.10  0.10 - 1.00 x10*3/uL Final    Eosinophils Absolute, Manual 05/07/2025 0.01  0.00 - 0.70 x10*3/uL Final    Basophils Absolute, Manual 05/07/2025 0.00  0.00 - 0.10 x10*3/uL Final    Total Cells Counted 05/07/2025 100   Final    RBC Morphology 05/07/2025 See Below   Final    Ovalocytes 05/07/2025 Few   Final      Lab Results   Component Value Date    LABCA2 49.9 (H) 05/07/2025    LABCA2 40.9 (H) 12/27/2024    LABCA2 36.5 09/23/2024    LABCA2 36.3 07/10/2024    LABCA2 39.3 (H) 06/28/2024       FINAL DIAGNOSIS   A. Left chest wall mass, 10:00, ultrasound guided core needle biopsy:  -- Invasive ductal carcinoma, grade 3 involving fibrous tissue and skeletal muscle, see note.     Note:  In this limited sample, the invasive carcinoma  measures up to 12 mm in greatest dimension.  ER, WY and HER2 will be reported in an addendum.      Electronically signed by Gabby Mcleod MD on 2024 at 1008      [facility]   By the signature on this report, the individual or group listed as making the Final Interpretation/Diagnosis certifies that they have reviewed this case.    Addendum   Surgical/Block Number:        [id number]  Specimen Site:         Left chest wall mass  Specimen Type:       core needle biopsy        Estrogen Receptor (clone SP1):                     Positive                                                                          Percentage with nuclear stainin-95%                                                                          Intensity of staining: Moderate to Strong     Progesterone Receptor (clone SP2): Positive                                                                         Percentage with nuclear stainin-40%                                                                          Intensity of staining: Moderate to Strong     HER2 (clone [id number]):                                                                                                                    Equivocal (2+).  JESSICA is pending and will be reported in addendum.                                                                            Comment:         Addendum electronically signed by Gabby Mcleod MD on 2024 at 1014   Addend   HER2 DUAL JESSICA FOR DETECTION OF HER2 GENE AMPLIFICATION  HER2 Dual JESSICA DNA Probe Cocktail Assay from [employer] ([employer])    Results are expressed as the averaged ratio HER2/chromosome 17 signals in 20 nuclei.        Source of Specimen: Left chest wall mass biopsy  Paraffin Block: [id number] A1  Duration of fixation:  unless otherwise noted, 6-72 hours fixation     TEST RESULTS:    Number of tumor cells counted:  20  Number of observers: 1  Average number of HER2 signals/nucleus:  2.6  Average  number of CEP 17 signals/nucleus: 2.1  Ratio of average HER2/CEP 17:  1.2     INTERPRETATION:       Negative (Not amplified)         IMPRESSION/PLAN    Recurrent oligometastatic ER+/FL+/HER2 2+ breast cancer with chest wall/sternal mass and left axillary and intrathoracic nodes. Continue present therapy and order PET/CT scan in about 4 months. Follow-up with mike duvall 1-2 wks after scan. Baseline DEXA scan showed mild osteopenia.  Monitor for now. Plan will be if PET/CT shows no new disease then send back to Dr Damon for radiation therapy.    We discussed the clinical significance of diagnosis, goals of care and treatment plan in detail.     I personally spent over half of a total 30 minutes face to face with the patient in counseling and discussion and/or coordination of care as described above.         -------------------------------------------------------------------------------------------------------------------------------  Keshav Camarena MD  Director of Breast Cancer Medical Oncology Research Program   [facility]  Professor of Medicine  [address], [address]  [place] [id number]  Phone: [phone]  [email or web address]

## 2025-06-10 ENCOUNTER — SOCIAL WORK (OUTPATIENT)
Dept: CASE MANAGEMENT | Facility: HOSPITAL | Age: 65
End: 2025-06-10
Payer: COMMERCIAL

## 2025-06-10 NOTE — PROGRESS NOTES
Work Documentation    Documentation Type: Transition of Care and Continuity of Care Application   Date Received:6/10/2025  Individual Requesting Documentation: Patient   Completed By: LÁZARO Montoya Dr.  Documentation submitted to : Patient via [employer] on 06/10/25  Additional Information: N/A      Documentation scanned into EMR. Pt updated. SW will continue to follow as needed.

## 2025-06-25 ENCOUNTER — TELEPHONE (OUTPATIENT)
Dept: ADMISSION | Facility: HOSPITAL | Age: 65
End: 2025-06-25
Payer: COMMERCIAL

## 2025-06-25 NOTE — TELEPHONE ENCOUNTER
[facility] calling to advise they received an invalid script for kisqali.   Original script from 4/23 was sent to [facility].   Please send to [facility] if appropriate.

## 2025-07-03 DIAGNOSIS — Z17.0 MALIGNANT NEOPLASM OF LEFT BREAST IN FEMALE, ESTROGEN RECEPTOR POSITIVE, UNSPECIFIED SITE OF BREAST: ICD-10-CM

## 2025-07-03 DIAGNOSIS — C50.912 MALIGNANT NEOPLASM OF LEFT BREAST IN FEMALE, ESTROGEN RECEPTOR POSITIVE, UNSPECIFIED SITE OF BREAST: ICD-10-CM

## 2025-07-07 ENCOUNTER — TELEPHONE (OUTPATIENT)
Dept: HEMATOLOGY/ONCOLOGY | Facility: HOSPITAL | Age: 65
End: 2025-07-07
Payer: COMMERCIAL

## 2025-07-07 ENCOUNTER — SOCIAL WORK (OUTPATIENT)
Dept: CASE MANAGEMENT | Facility: HOSPITAL | Age: 65
End: 2025-07-07
Payer: COMMERCIAL

## 2025-07-07 NOTE — TELEPHONE ENCOUNTER
VM left for pt we are looking into issues with drug issues. I have instructed her to call once she has more info regarding insurance changes

## 2025-07-07 NOTE — PROGRESS NOTES
TAWANA received message from MICHELLE Hernandez CNP to check on patient's Kisqali medication. Patient recently switched to [employer] insurance and has to go through [facility]. Patient is supposed to start her medication on . TAWANA called [facility] today to check on status at [phone]. Patient had called as well checking on copay assistance as her copay card had . TAWANA was informed it is still in process and TAWANA offered any assistance form provider office as needed. Patient will receive a call once determined. TAWANA updated team.

## 2025-08-26 DIAGNOSIS — C50.912 CHEST WALL RECURRENCE OF BREAST CANCER, LEFT: Primary | ICD-10-CM

## 2025-08-26 DIAGNOSIS — C79.89 CHEST WALL RECURRENCE OF BREAST CANCER, LEFT: Primary | ICD-10-CM

## 2025-08-26 LAB
ALBUMIN SERPL BCP-MCNC: 3.9 G/DL (ref 3.4–5)
ALP SERPL-CCNC: 90 U/L (ref 33–136)
ALT SERPL W P-5'-P-CCNC: 17 U/L (ref 7–45)
ANION GAP SERPL CALC-SCNC: 12 MMOL/L (ref 10–20)
AST SERPL W P-5'-P-CCNC: 21 U/L (ref 9–39)
BASOPHILS # BLD MANUAL: 0.04 X10*3/UL (ref 0–0.1)
BASOPHILS NFR BLD MANUAL: 3 %
BILIRUB SERPL-MCNC: 0.5 MG/DL (ref 0–1.2)
BUN SERPL-MCNC: 15 MG/DL (ref 6–23)
CALCIUM SERPL-MCNC: 8.8 MG/DL (ref 8.6–10.3)
CHLORIDE SERPL-SCNC: 104 MMOL/L (ref 98–107)
CO2 SERPL-SCNC: 29 MMOL/L (ref 21–32)
CREAT SERPL-MCNC: 1.29 MG/DL (ref 0.5–1.05)
EGFRCR SERPLBLD CKD-EPI 2021: 46 ML/MIN/1.73M*2
EOSINOPHIL # BLD MANUAL: 0.01 X10*3/UL (ref 0–0.7)
EOSINOPHIL NFR BLD MANUAL: 1 %
ERYTHROCYTE [DISTWIDTH] IN BLOOD BY AUTOMATED COUNT: 21.2 % (ref 11.5–14.5)
GLUCOSE SERPL-MCNC: 101 MG/DL (ref 74–99)
HCT VFR BLD AUTO: 27 % (ref 36–46)
HGB BLD-MCNC: 8.7 G/DL (ref 12–16)
HYPOCHROMIA BLD QL SMEAR: ABNORMAL
IMM GRANULOCYTES # BLD AUTO: 0.01 X10*3/UL (ref 0–0.7)
IMM GRANULOCYTES NFR BLD AUTO: 0.8 % (ref 0–0.9)
LYMPHOCYTES # BLD MANUAL: 0.25 X10*3/UL (ref 1.2–4.8)
LYMPHOCYTES NFR BLD MANUAL: 21 %
MCH RBC QN AUTO: 34.1 PG (ref 26–34)
MCHC RBC AUTO-ENTMCNC: 32.2 G/DL (ref 32–36)
MCV RBC AUTO: 106 FL (ref 80–100)
MONOCYTES # BLD MANUAL: 0.1 X10*3/UL (ref 0.1–1)
MONOCYTES NFR BLD MANUAL: 8 %
NEUTS SEG # BLD MANUAL: 0.8 X10*3/UL (ref 1.2–7)
NEUTS SEG NFR BLD MANUAL: 67 %
NRBC BLD-RTO: 0 /100 WBCS (ref 0–0)
OVALOCYTES BLD QL SMEAR: ABNORMAL
PLATELET # BLD AUTO: 222 X10*3/UL (ref 150–450)
POTASSIUM SERPL-SCNC: 4.6 MMOL/L (ref 3.5–5.3)
PROT SERPL-MCNC: 6.9 G/DL (ref 6.4–8.2)
RBC # BLD AUTO: 2.55 X10*6/UL (ref 4–5.2)
RBC MORPH BLD: ABNORMAL
SODIUM SERPL-SCNC: 140 MMOL/L (ref 136–145)
TOTAL CELLS COUNTED BLD: 100
WBC # BLD AUTO: 1.2 X10*3/UL (ref 4.4–11.3)

## 2025-08-26 PROCEDURE — 86300 IMMUNOASSAY TUMOR CA 15-3: CPT

## 2025-08-26 PROCEDURE — 85027 COMPLETE CBC AUTOMATED: CPT

## 2025-08-26 PROCEDURE — 85007 BL SMEAR W/DIFF WBC COUNT: CPT

## 2025-08-26 PROCEDURE — 80053 COMPREHEN METABOLIC PANEL: CPT

## 2025-08-27 LAB — CANCER AG27-29 SERPL-ACNC: 50.7 U/ML (ref 0–38.6)

## 2025-09-03 ENCOUNTER — HOSPITAL ENCOUNTER (OUTPATIENT)
Dept: RADIOLOGY | Facility: CLINIC | Age: 65
Discharge: HOME | End: 2025-09-03
Payer: COMMERCIAL

## 2025-09-03 DIAGNOSIS — Z79.811 AROMATASE INHIBITOR USE: ICD-10-CM

## 2025-09-03 DIAGNOSIS — T45.1X5A CHEMOTHERAPY-INDUCED FATIGUE: ICD-10-CM

## 2025-09-03 DIAGNOSIS — Z17.0 MALIGNANT NEOPLASM OF LEFT BREAST IN FEMALE, ESTROGEN RECEPTOR POSITIVE, UNSPECIFIED SITE OF BREAST: ICD-10-CM

## 2025-09-03 DIAGNOSIS — Z85.3 ENCOUNTER FOR FOLLOW-UP SURVEILLANCE OF BREAST CANCER: ICD-10-CM

## 2025-09-03 DIAGNOSIS — Z08 ENCOUNTER FOR FOLLOW-UP SURVEILLANCE OF BREAST CANCER: ICD-10-CM

## 2025-09-03 DIAGNOSIS — Z51.11 ENCOUNTER FOR ANTINEOPLASTIC CHEMOTHERAPY: ICD-10-CM

## 2025-09-03 DIAGNOSIS — R53.83 CHEMOTHERAPY-INDUCED FATIGUE: ICD-10-CM

## 2025-09-03 DIAGNOSIS — C50.912 MALIGNANT NEOPLASM OF LEFT BREAST IN FEMALE, ESTROGEN RECEPTOR POSITIVE, UNSPECIFIED SITE OF BREAST: ICD-10-CM

## 2025-09-03 DIAGNOSIS — Z90.12 HISTORY OF LEFT MASTECTOMY: ICD-10-CM

## 2025-09-03 DIAGNOSIS — C50.912 CHEST WALL RECURRENCE OF BREAST CANCER, LEFT: ICD-10-CM

## 2025-09-03 DIAGNOSIS — C79.89 CHEST WALL RECURRENCE OF BREAST CANCER, LEFT: ICD-10-CM

## 2025-09-03 PROCEDURE — A9552 F18 FDG: HCPCS | Performed by: INTERNAL MEDICINE

## 2025-09-03 PROCEDURE — 3430000001 HC RX 343 DIAGNOSTIC RADIOPHARMACEUTICALS: Performed by: INTERNAL MEDICINE

## 2025-09-03 PROCEDURE — 78815 PET IMAGE W/CT SKULL-THIGH: CPT | Mod: PET TUMOR SUBSQ TX STRATEGY | Performed by: RADIOLOGY

## 2025-09-03 PROCEDURE — 78815 PET IMAGE W/CT SKULL-THIGH: CPT | Mod: PS

## 2025-09-03 RX ORDER — FLUDEOXYGLUCOSE F 18 200 MCI/ML
14.46 INJECTION, SOLUTION INTRAVENOUS
Status: COMPLETED | OUTPATIENT
Start: 2025-09-03 | End: 2025-09-03

## 2025-09-03 RX ADMIN — FLUDEOXYGLUCOSE F 18 14.46 MILLICURIE: 200 INJECTION, SOLUTION INTRAVENOUS at 08:30
